# Patient Record
Sex: MALE | Race: WHITE | Employment: FULL TIME | URBAN - METROPOLITAN AREA
[De-identification: names, ages, dates, MRNs, and addresses within clinical notes are randomized per-mention and may not be internally consistent; named-entity substitution may affect disease eponyms.]

---

## 2017-07-20 ENCOUNTER — HOSPITAL ENCOUNTER (EMERGENCY)
Age: 27
Discharge: HOME | End: 2017-07-20
Attending: EMERGENCY MEDICINE
Payer: COMMERCIAL

## 2017-07-20 VITALS
WEIGHT: 190 LBS | BODY MASS INDEX: 28.79 KG/M2 | RESPIRATION RATE: 16 BRPM | SYSTOLIC BLOOD PRESSURE: 117 MMHG | HEIGHT: 68 IN | HEART RATE: 77 BPM | OXYGEN SATURATION: 100 % | DIASTOLIC BLOOD PRESSURE: 69 MMHG | TEMPERATURE: 97.5 F

## 2017-07-20 DIAGNOSIS — S61.209A FINGERTIP AVULSION, INITIAL ENCOUNTER: Primary | ICD-10-CM

## 2017-07-20 PROCEDURE — 99282 EMERGENCY DEPT VISIT SF MDM: CPT

## 2017-07-20 PROCEDURE — 2500000003 HC RX 250 WO HCPCS

## 2017-07-20 PROCEDURE — 90715 TDAP VACCINE 7 YRS/> IM: CPT | Performed by: PHYSICIAN ASSISTANT

## 2017-07-20 PROCEDURE — 2500000003 HC RX 250 WO HCPCS: Performed by: PHYSICIAN ASSISTANT

## 2017-07-20 PROCEDURE — 6360000002 HC RX W HCPCS: Performed by: PHYSICIAN ASSISTANT

## 2017-07-20 PROCEDURE — 6370000000 HC RX 637 (ALT 250 FOR IP): Performed by: EMERGENCY MEDICINE

## 2017-07-20 PROCEDURE — 12001 RPR S/N/AX/GEN/TRNK 2.5CM/<: CPT

## 2017-07-20 PROCEDURE — 90471 IMMUNIZATION ADMIN: CPT | Performed by: PHYSICIAN ASSISTANT

## 2017-07-20 RX ORDER — LIDOCAINE HYDROCHLORIDE AND EPINEPHRINE BITARTRATE 20; .01 MG/ML; MG/ML
20 INJECTION, SOLUTION SUBCUTANEOUS ONCE
Status: COMPLETED | OUTPATIENT
Start: 2017-07-20 | End: 2017-07-20

## 2017-07-20 RX ORDER — OXYCODONE HYDROCHLORIDE AND ACETAMINOPHEN 5; 325 MG/1; MG/1
2 TABLET ORAL ONCE
Status: COMPLETED | OUTPATIENT
Start: 2017-07-20 | End: 2017-07-20

## 2017-07-20 RX ORDER — LIDOCAINE HYDROCHLORIDE AND EPINEPHRINE 10; 10 MG/ML; UG/ML
20 INJECTION, SOLUTION INFILTRATION; PERINEURAL ONCE
Status: DISCONTINUED | OUTPATIENT
Start: 2017-07-20 | End: 2017-07-20

## 2017-07-20 RX ORDER — LIDOCAINE HYDROCHLORIDE 10 MG/ML
INJECTION, SOLUTION INFILTRATION; PERINEURAL
Status: COMPLETED
Start: 2017-07-20 | End: 2017-07-20

## 2017-07-20 RX ADMIN — OXYCODONE HYDROCHLORIDE AND ACETAMINOPHEN 2 TABLET: 5; 325 TABLET ORAL at 21:21

## 2017-07-20 RX ADMIN — LIDOCAINE HYDROCHLORIDE,EPINEPHRINE BITARTRATE 20 ML: 20; .01 INJECTION, SOLUTION INFILTRATION; PERINEURAL at 20:59

## 2017-07-20 RX ADMIN — LIDOCAINE HYDROCHLORIDE: 10 INJECTION, SOLUTION INFILTRATION; PERINEURAL at 20:15

## 2017-07-20 RX ADMIN — TETANUS TOXOID, REDUCED DIPHTHERIA TOXOID AND ACELLULAR PERTUSSIS VACCINE, ADSORBED 0.5 ML: 5; 2.5; 8; 8; 2.5 SUSPENSION INTRAMUSCULAR at 21:12

## 2017-07-20 ASSESSMENT — PAIN DESCRIPTION - LOCATION: LOCATION: FINGER (COMMENT WHICH ONE)

## 2017-07-20 ASSESSMENT — ENCOUNTER SYMPTOMS
STRIDOR: 0
COUGH: 0
WHEEZING: 0

## 2017-07-20 ASSESSMENT — PAIN SCALES - GENERAL
PAINLEVEL_OUTOF10: 10
PAINLEVEL_OUTOF10: 8
PAINLEVEL_OUTOF10: 10
PAINLEVEL_OUTOF10: 10

## 2018-09-13 ENCOUNTER — HOSPITAL ENCOUNTER (OUTPATIENT)
Dept: GENERAL RADIOLOGY | Age: 28
Discharge: HOME | End: 2018-09-15
Payer: COMMERCIAL

## 2018-09-13 ENCOUNTER — HOSPITAL ENCOUNTER (OUTPATIENT)
Age: 28
Discharge: HOME | End: 2018-09-13
Payer: COMMERCIAL

## 2018-09-13 DIAGNOSIS — T14.90XA INJURY: ICD-10-CM

## 2018-09-13 PROCEDURE — 73610 X-RAY EXAM OF ANKLE: CPT

## 2019-06-06 ENCOUNTER — HOSPITAL ENCOUNTER (INPATIENT)
Age: 29
LOS: 1 days | Discharge: HOME | DRG: 379 | End: 2019-06-07
Attending: EMERGENCY MEDICINE | Admitting: INTERNAL MEDICINE
Payer: COMMERCIAL

## 2019-06-06 ENCOUNTER — APPOINTMENT (OUTPATIENT)
Dept: GENERAL RADIOLOGY | Age: 29
DRG: 379 | End: 2019-06-06
Payer: COMMERCIAL

## 2019-06-06 ENCOUNTER — APPOINTMENT (OUTPATIENT)
Dept: CT IMAGING | Age: 29
DRG: 379 | End: 2019-06-06
Payer: COMMERCIAL

## 2019-06-06 DIAGNOSIS — K62.5 RECTAL BLEED: Primary | ICD-10-CM

## 2019-06-06 DIAGNOSIS — R59.0 ABDOMINAL LYMPHADENOPATHY: ICD-10-CM

## 2019-06-06 PROBLEM — K92.2 GI BLEED: Status: ACTIVE | Noted: 2019-06-06

## 2019-06-06 LAB
ABO/RH: NORMAL
ABSOLUTE EOS #: 0.1 K/UL (ref 0–0.4)
ABSOLUTE IMMATURE GRANULOCYTE: NORMAL K/UL (ref 0–0.3)
ABSOLUTE LYMPH #: 1.8 K/UL (ref 1–4.8)
ABSOLUTE MONO #: 0.5 K/UL (ref 0.1–1.3)
ALBUMIN SERPL-MCNC: 4.6 G/DL (ref 3.5–5.2)
ALBUMIN/GLOBULIN RATIO: NORMAL (ref 1–2.5)
ALP BLD-CCNC: 74 U/L (ref 40–129)
ALT SERPL-CCNC: 29 U/L (ref 5–41)
ANION GAP SERPL CALCULATED.3IONS-SCNC: 15 MMOL/L (ref 9–17)
ANTIBODY SCREEN: NEGATIVE
ARM BAND NUMBER: NORMAL
AST SERPL-CCNC: 28 U/L
BASOPHILS # BLD: 1 % (ref 0–2)
BASOPHILS ABSOLUTE: 0.1 K/UL (ref 0–0.2)
BILIRUB SERPL-MCNC: 0.53 MG/DL (ref 0.3–1.2)
BLOOD BANK COMMENT: NORMAL
BUN BLDV-MCNC: 15 MG/DL (ref 6–20)
BUN/CREAT BLD: NORMAL (ref 9–20)
CALCIUM SERPL-MCNC: 8.9 MG/DL (ref 8.6–10.4)
CHLORIDE BLD-SCNC: 104 MMOL/L (ref 98–107)
CO2: 21 MMOL/L (ref 20–31)
CREAT SERPL-MCNC: 1.04 MG/DL (ref 0.7–1.2)
DIFFERENTIAL TYPE: NORMAL
EOSINOPHILS RELATIVE PERCENT: 1 % (ref 0–4)
EXPIRATION DATE: NORMAL
GFR AFRICAN AMERICAN: >60 ML/MIN
GFR NON-AFRICAN AMERICAN: >60 ML/MIN
GFR SERPL CREATININE-BSD FRML MDRD: NORMAL ML/MIN/{1.73_M2}
GFR SERPL CREATININE-BSD FRML MDRD: NORMAL ML/MIN/{1.73_M2}
GLUCOSE BLD-MCNC: 88 MG/DL (ref 70–99)
HCT VFR BLD CALC: 42.4 % (ref 41–53)
HEMOGLOBIN: 14.5 G/DL (ref 13.5–17.5)
IMMATURE GRANULOCYTES: NORMAL %
INR BLD: 1
LIPASE: 34 U/L (ref 13–60)
LYMPHOCYTES # BLD: 27 % (ref 24–44)
MAGNESIUM: 1.9 MG/DL (ref 1.6–2.6)
MCH RBC QN AUTO: 28.9 PG (ref 26–34)
MCHC RBC AUTO-ENTMCNC: 34.2 G/DL (ref 31–37)
MCV RBC AUTO: 84.7 FL (ref 80–100)
MONOCYTES # BLD: 7 % (ref 1–7)
NRBC AUTOMATED: NORMAL PER 100 WBC
PARTIAL THROMBOPLASTIN TIME: 27.2 SEC (ref 24–36)
PDW BLD-RTO: 13.3 % (ref 11.5–14.9)
PLATELET # BLD: 211 K/UL (ref 150–450)
PLATELET ESTIMATE: NORMAL
PMV BLD AUTO: 6.9 FL (ref 6–12)
POTASSIUM SERPL-SCNC: 3.8 MMOL/L (ref 3.7–5.3)
PROTHROMBIN TIME: 12.8 SEC (ref 11.8–14.6)
RBC # BLD: 5.01 M/UL (ref 4.5–5.9)
RBC # BLD: NORMAL 10*6/UL
SEG NEUTROPHILS: 64 % (ref 36–66)
SEGMENTED NEUTROPHILS ABSOLUTE COUNT: 4.3 K/UL (ref 1.3–9.1)
SODIUM BLD-SCNC: 140 MMOL/L (ref 135–144)
TOTAL PROTEIN: 7.6 G/DL (ref 6.4–8.3)
WBC # BLD: 6.8 K/UL (ref 3.5–11)
WBC # BLD: NORMAL 10*3/UL

## 2019-06-06 PROCEDURE — 96374 THER/PROPH/DIAG INJ IV PUSH: CPT

## 2019-06-06 PROCEDURE — G0378 HOSPITAL OBSERVATION PER HR: HCPCS

## 2019-06-06 PROCEDURE — 85610 PROTHROMBIN TIME: CPT

## 2019-06-06 PROCEDURE — 85025 COMPLETE CBC W/AUTO DIFF WBC: CPT

## 2019-06-06 PROCEDURE — 80053 COMPREHEN METABOLIC PANEL: CPT

## 2019-06-06 PROCEDURE — 2500000003 HC RX 250 WO HCPCS: Performed by: NURSE PRACTITIONER

## 2019-06-06 PROCEDURE — 71046 X-RAY EXAM CHEST 2 VIEWS: CPT

## 2019-06-06 PROCEDURE — 99999 PR OFFICE/OUTPT VISIT,PROCEDURE ONLY: CPT | Performed by: INTERNAL MEDICINE

## 2019-06-06 PROCEDURE — 86900 BLOOD TYPING SEROLOGIC ABO: CPT

## 2019-06-06 PROCEDURE — 83690 ASSAY OF LIPASE: CPT

## 2019-06-06 PROCEDURE — 6360000002 HC RX W HCPCS: Performed by: NURSE PRACTITIONER

## 2019-06-06 PROCEDURE — 99285 EMERGENCY DEPT VISIT HI MDM: CPT

## 2019-06-06 PROCEDURE — 86901 BLOOD TYPING SEROLOGIC RH(D): CPT

## 2019-06-06 PROCEDURE — 86850 RBC ANTIBODY SCREEN: CPT

## 2019-06-06 PROCEDURE — 74177 CT ABD & PELVIS W/CONTRAST: CPT

## 2019-06-06 PROCEDURE — 83735 ASSAY OF MAGNESIUM: CPT

## 2019-06-06 PROCEDURE — 2580000003 HC RX 258: Performed by: EMERGENCY MEDICINE

## 2019-06-06 PROCEDURE — 96375 TX/PRO/DX INJ NEW DRUG ADDON: CPT

## 2019-06-06 PROCEDURE — 6360000002 HC RX W HCPCS: Performed by: EMERGENCY MEDICINE

## 2019-06-06 PROCEDURE — 2580000003 HC RX 258: Performed by: INTERNAL MEDICINE

## 2019-06-06 PROCEDURE — 96376 TX/PRO/DX INJ SAME DRUG ADON: CPT

## 2019-06-06 PROCEDURE — 6360000004 HC RX CONTRAST MEDICATION: Performed by: EMERGENCY MEDICINE

## 2019-06-06 PROCEDURE — 85730 THROMBOPLASTIN TIME PARTIAL: CPT

## 2019-06-06 PROCEDURE — 36415 COLL VENOUS BLD VENIPUNCTURE: CPT

## 2019-06-06 RX ORDER — MAGNESIUM SULFATE 1 G/100ML
1 INJECTION INTRAVENOUS PRN
Status: DISCONTINUED | OUTPATIENT
Start: 2019-06-06 | End: 2019-06-07 | Stop reason: HOSPADM

## 2019-06-06 RX ORDER — 0.9 % SODIUM CHLORIDE 0.9 %
80 INTRAVENOUS SOLUTION INTRAVENOUS ONCE
Status: COMPLETED | OUTPATIENT
Start: 2019-06-06 | End: 2019-06-06

## 2019-06-06 RX ORDER — SODIUM CHLORIDE 0.9 % (FLUSH) 0.9 %
10 SYRINGE (ML) INJECTION PRN
Status: DISCONTINUED | OUTPATIENT
Start: 2019-06-06 | End: 2019-06-07 | Stop reason: HOSPADM

## 2019-06-06 RX ORDER — FENTANYL CITRATE 50 UG/ML
100 INJECTION, SOLUTION INTRAMUSCULAR; INTRAVENOUS ONCE
Status: COMPLETED | OUTPATIENT
Start: 2019-06-06 | End: 2019-06-06

## 2019-06-06 RX ORDER — ONDANSETRON 2 MG/ML
4 INJECTION INTRAMUSCULAR; INTRAVENOUS EVERY 6 HOURS PRN
Status: DISCONTINUED | OUTPATIENT
Start: 2019-06-06 | End: 2019-06-07 | Stop reason: HOSPADM

## 2019-06-06 RX ORDER — ACETAMINOPHEN 325 MG/1
650 TABLET ORAL EVERY 4 HOURS PRN
Status: DISCONTINUED | OUTPATIENT
Start: 2019-06-06 | End: 2019-06-07 | Stop reason: HOSPADM

## 2019-06-06 RX ORDER — NICOTINE 21 MG/24HR
1 PATCH, TRANSDERMAL 24 HOURS TRANSDERMAL DAILY PRN
Status: DISCONTINUED | OUTPATIENT
Start: 2019-06-06 | End: 2019-06-07 | Stop reason: HOSPADM

## 2019-06-06 RX ORDER — MORPHINE SULFATE 4 MG/ML
4 INJECTION, SOLUTION INTRAMUSCULAR; INTRAVENOUS ONCE
Status: COMPLETED | OUTPATIENT
Start: 2019-06-06 | End: 2019-06-06

## 2019-06-06 RX ORDER — SODIUM CHLORIDE 0.9 % (FLUSH) 0.9 %
10 SYRINGE (ML) INJECTION EVERY 12 HOURS SCHEDULED
Status: DISCONTINUED | OUTPATIENT
Start: 2019-06-06 | End: 2019-06-07 | Stop reason: HOSPADM

## 2019-06-06 RX ORDER — POTASSIUM CHLORIDE 20 MEQ/1
40 TABLET, EXTENDED RELEASE ORAL PRN
Status: DISCONTINUED | OUTPATIENT
Start: 2019-06-06 | End: 2019-06-07 | Stop reason: HOSPADM

## 2019-06-06 RX ORDER — DEXTROSE, SODIUM CHLORIDE, AND POTASSIUM CHLORIDE 5; .45; .15 G/100ML; G/100ML; G/100ML
INJECTION INTRAVENOUS CONTINUOUS
Status: DISCONTINUED | OUTPATIENT
Start: 2019-06-06 | End: 2019-06-07 | Stop reason: HOSPADM

## 2019-06-06 RX ORDER — ONDANSETRON 2 MG/ML
4 INJECTION INTRAMUSCULAR; INTRAVENOUS ONCE
Status: COMPLETED | OUTPATIENT
Start: 2019-06-06 | End: 2019-06-06

## 2019-06-06 RX ORDER — POTASSIUM CHLORIDE 7.45 MG/ML
10 INJECTION INTRAVENOUS PRN
Status: DISCONTINUED | OUTPATIENT
Start: 2019-06-06 | End: 2019-06-07 | Stop reason: HOSPADM

## 2019-06-06 RX ADMIN — IOVERSOL 75 ML: 741 INJECTION INTRA-ARTERIAL; INTRAVENOUS at 19:55

## 2019-06-06 RX ADMIN — FENTANYL CITRATE 100 MCG: 50 INJECTION INTRAMUSCULAR; INTRAVENOUS at 19:29

## 2019-06-06 RX ADMIN — ONDANSETRON 4 MG: 2 INJECTION INTRAMUSCULAR; INTRAVENOUS at 18:21

## 2019-06-06 RX ADMIN — POTASSIUM CHLORIDE, DEXTROSE MONOHYDRATE AND SODIUM CHLORIDE: 150; 5; 450 INJECTION, SOLUTION INTRAVENOUS at 23:16

## 2019-06-06 RX ADMIN — MORPHINE SULFATE 4 MG: 4 INJECTION INTRAVENOUS at 18:23

## 2019-06-06 RX ADMIN — SODIUM CHLORIDE 80 ML: 9 INJECTION, SOLUTION INTRAVENOUS at 19:54

## 2019-06-06 RX ADMIN — FENTANYL CITRATE 100 MCG: 50 INJECTION, SOLUTION INTRAMUSCULAR; INTRAVENOUS at 21:26

## 2019-06-06 RX ADMIN — Medication 10 ML: at 22:13

## 2019-06-06 RX ADMIN — HYDROMORPHONE HYDROCHLORIDE 0.5 MG: 1 INJECTION, SOLUTION INTRAMUSCULAR; INTRAVENOUS; SUBCUTANEOUS at 22:58

## 2019-06-06 RX ADMIN — Medication 10 ML: at 19:55

## 2019-06-06 ASSESSMENT — PAIN SCALES - GENERAL
PAINLEVEL_OUTOF10: 9
PAINLEVEL_OUTOF10: 0
PAINLEVEL_OUTOF10: 5
PAINLEVEL_OUTOF10: 8
PAINLEVEL_OUTOF10: 7
PAINLEVEL_OUTOF10: 5
PAINLEVEL_OUTOF10: 8

## 2019-06-06 ASSESSMENT — PAIN DESCRIPTION - PAIN TYPE
TYPE: ACUTE PAIN
TYPE: ACUTE PAIN

## 2019-06-06 ASSESSMENT — ENCOUNTER SYMPTOMS
CONSTIPATION: 0
ABDOMINAL PAIN: 1
BACK PAIN: 0
TROUBLE SWALLOWING: 0
DIARRHEA: 0
SORE THROAT: 0
COLOR CHANGE: 0
BLOOD IN STOOL: 1
NAUSEA: 0
COUGH: 0
VOMITING: 0
SHORTNESS OF BREATH: 0

## 2019-06-06 ASSESSMENT — PAIN DESCRIPTION - LOCATION: LOCATION: ABDOMEN

## 2019-06-06 ASSESSMENT — PAIN DESCRIPTION - ORIENTATION: ORIENTATION: LEFT;MID;LOWER;UPPER

## 2019-06-06 ASSESSMENT — PAIN DESCRIPTION - DESCRIPTORS: DESCRIPTORS: ACHING

## 2019-06-06 NOTE — ED NOTES
Writer stood by for rectal exam performed by Dr. Abi Franklin. Pt tolerated the procedure fair.       Markie Alarcon RN  06/06/19 1921

## 2019-06-06 NOTE — ED NOTES
Report given to Arlester Schirmer ED RN, who is assuming care of the Pt at this time. Writer updated in regards to issues that brought the Pt into the ED, diagnosis, assessment, VS's, MEWs, Labs, C-Xray, CT Scan ordered, and meds given. All questions answered.         Annette Mccann RN  06/06/19 8435
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
## 2019-06-07 VITALS
SYSTOLIC BLOOD PRESSURE: 142 MMHG | RESPIRATION RATE: 16 BRPM | DIASTOLIC BLOOD PRESSURE: 64 MMHG | TEMPERATURE: 98.4 F | HEIGHT: 68 IN | WEIGHT: 201.72 LBS | OXYGEN SATURATION: 100 % | BODY MASS INDEX: 30.57 KG/M2 | HEART RATE: 68 BPM

## 2019-06-07 PROBLEM — I88.0 MESENTERIC ADENITIS: Status: ACTIVE | Noted: 2019-06-07

## 2019-06-07 PROBLEM — R59.0 MESENTERIC LYMPHADENOPATHY: Status: ACTIVE | Noted: 2019-06-07

## 2019-06-07 LAB
ANION GAP SERPL CALCULATED.3IONS-SCNC: 9 MMOL/L (ref 9–17)
BUN BLDV-MCNC: 15 MG/DL (ref 6–20)
BUN/CREAT BLD: ABNORMAL (ref 9–20)
CALCIUM SERPL-MCNC: 8.8 MG/DL (ref 8.6–10.4)
CHLORIDE BLD-SCNC: 108 MMOL/L (ref 98–107)
CO2: 26 MMOL/L (ref 20–31)
CREAT SERPL-MCNC: 1.16 MG/DL (ref 0.7–1.2)
GFR AFRICAN AMERICAN: >60 ML/MIN
GFR NON-AFRICAN AMERICAN: >60 ML/MIN
GFR SERPL CREATININE-BSD FRML MDRD: ABNORMAL ML/MIN/{1.73_M2}
GFR SERPL CREATININE-BSD FRML MDRD: ABNORMAL ML/MIN/{1.73_M2}
GLUCOSE BLD-MCNC: 99 MG/DL (ref 70–99)
HCT VFR BLD CALC: 38 % (ref 41–53)
HEMOGLOBIN: 13.4 G/DL (ref 13.5–17.5)
INR BLD: 1.1
MCH RBC QN AUTO: 29.8 PG (ref 26–34)
MCHC RBC AUTO-ENTMCNC: 35.3 G/DL (ref 31–37)
MCV RBC AUTO: 84.3 FL (ref 80–100)
NRBC AUTOMATED: ABNORMAL PER 100 WBC
PDW BLD-RTO: 13.3 % (ref 11.5–14.9)
PLATELET # BLD: 191 K/UL (ref 150–450)
PMV BLD AUTO: 6.9 FL (ref 6–12)
POTASSIUM SERPL-SCNC: 3.8 MMOL/L (ref 3.7–5.3)
PROTHROMBIN TIME: 13.8 SEC (ref 11.8–14.6)
RBC # BLD: 4.51 M/UL (ref 4.5–5.9)
SODIUM BLD-SCNC: 143 MMOL/L (ref 135–144)
WBC # BLD: 4.7 K/UL (ref 3.5–11)

## 2019-06-07 PROCEDURE — 2500000003 HC RX 250 WO HCPCS: Performed by: NURSE PRACTITIONER

## 2019-06-07 PROCEDURE — 99254 IP/OBS CNSLTJ NEW/EST MOD 60: CPT | Performed by: INTERNAL MEDICINE

## 2019-06-07 PROCEDURE — 80048 BASIC METABOLIC PNL TOTAL CA: CPT

## 2019-06-07 PROCEDURE — 96375 TX/PRO/DX INJ NEW DRUG ADDON: CPT

## 2019-06-07 PROCEDURE — 85610 PROTHROMBIN TIME: CPT

## 2019-06-07 PROCEDURE — C9113 INJ PANTOPRAZOLE SODIUM, VIA: HCPCS | Performed by: NURSE PRACTITIONER

## 2019-06-07 PROCEDURE — 2580000003 HC RX 258: Performed by: INTERNAL MEDICINE

## 2019-06-07 PROCEDURE — 99255 IP/OBS CONSLTJ NEW/EST HI 80: CPT | Performed by: INTERNAL MEDICINE

## 2019-06-07 PROCEDURE — APPNB30 APP NON BILLABLE TIME 0-30 MINS: Performed by: NURSE PRACTITIONER

## 2019-06-07 PROCEDURE — 96376 TX/PRO/DX INJ SAME DRUG ADON: CPT

## 2019-06-07 PROCEDURE — 1200000000 HC SEMI PRIVATE

## 2019-06-07 PROCEDURE — G0378 HOSPITAL OBSERVATION PER HR: HCPCS

## 2019-06-07 PROCEDURE — 6360000002 HC RX W HCPCS: Performed by: NURSE PRACTITIONER

## 2019-06-07 PROCEDURE — 36415 COLL VENOUS BLD VENIPUNCTURE: CPT

## 2019-06-07 PROCEDURE — 85027 COMPLETE CBC AUTOMATED: CPT

## 2019-06-07 PROCEDURE — 2580000003 HC RX 258: Performed by: NURSE PRACTITIONER

## 2019-06-07 RX ORDER — OMEPRAZOLE 20 MG/1
20 CAPSULE, DELAYED RELEASE ORAL DAILY
Qty: 15 CAPSULE | Refills: 0 | Status: SHIPPED | OUTPATIENT
Start: 2019-06-07 | End: 2020-11-19

## 2019-06-07 RX ORDER — PANTOPRAZOLE SODIUM 40 MG/10ML
40 INJECTION, POWDER, LYOPHILIZED, FOR SOLUTION INTRAVENOUS DAILY
Status: DISCONTINUED | OUTPATIENT
Start: 2019-06-07 | End: 2019-06-07 | Stop reason: HOSPADM

## 2019-06-07 RX ORDER — 0.9 % SODIUM CHLORIDE 0.9 %
10 VIAL (ML) INJECTION DAILY
Status: DISCONTINUED | OUTPATIENT
Start: 2019-06-07 | End: 2019-06-07 | Stop reason: HOSPADM

## 2019-06-07 RX ADMIN — SODIUM CHLORIDE 10 ML: 9 INJECTION INTRAMUSCULAR; INTRAVENOUS; SUBCUTANEOUS at 10:21

## 2019-06-07 RX ADMIN — HYDROMORPHONE HYDROCHLORIDE 0.5 MG: 1 INJECTION, SOLUTION INTRAMUSCULAR; INTRAVENOUS; SUBCUTANEOUS at 12:20

## 2019-06-07 RX ADMIN — HYDROMORPHONE HYDROCHLORIDE 0.5 MG: 1 INJECTION, SOLUTION INTRAMUSCULAR; INTRAVENOUS; SUBCUTANEOUS at 03:20

## 2019-06-07 RX ADMIN — POTASSIUM CHLORIDE, DEXTROSE MONOHYDRATE AND SODIUM CHLORIDE: 150; 5; 450 INJECTION, SOLUTION INTRAVENOUS at 12:20

## 2019-06-07 RX ADMIN — HYDROMORPHONE HYDROCHLORIDE 0.5 MG: 1 INJECTION, SOLUTION INTRAMUSCULAR; INTRAVENOUS; SUBCUTANEOUS at 16:28

## 2019-06-07 RX ADMIN — Medication 10 ML: at 10:22

## 2019-06-07 RX ADMIN — PANTOPRAZOLE SODIUM 40 MG: 40 INJECTION, POWDER, FOR SOLUTION INTRAVENOUS at 10:21

## 2019-06-07 RX ADMIN — HYDROMORPHONE HYDROCHLORIDE 0.5 MG: 1 INJECTION, SOLUTION INTRAMUSCULAR; INTRAVENOUS; SUBCUTANEOUS at 08:24

## 2019-06-07 ASSESSMENT — PAIN SCALES - GENERAL
PAINLEVEL_OUTOF10: 8
PAINLEVEL_OUTOF10: 8
PAINLEVEL_OUTOF10: 0
PAINLEVEL_OUTOF10: 7
PAINLEVEL_OUTOF10: 6
PAINLEVEL_OUTOF10: 9
PAINLEVEL_OUTOF10: 6

## 2019-06-07 ASSESSMENT — ENCOUNTER SYMPTOMS
CONSTIPATION: 0
COUGH: 0
DIARRHEA: 0
BACK PAIN: 0
WHEEZING: 0
SORE THROAT: 0
ABDOMINAL PAIN: 1
SHORTNESS OF BREATH: 0
RECTAL PAIN: 1
VOMITING: 0
BLOOD IN STOOL: 1
NAUSEA: 1

## 2019-06-07 NOTE — ED NOTES
Pt ambulated to the bathroom with a steady gait with this RN. Pt appears to be in no distress at this time.       Caron Stevens RN  06/06/19 2012

## 2019-06-07 NOTE — PROGRESS NOTES
Dr. Sebastián Juarez in to examine patient. Patient anxious to discharge and follow-up outpatient. Dr. Sebastián Juarez wants patient to call his office Monday morning to schedule a colonoscopy within the next 2 weeks.
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
## 2019-06-07 NOTE — DISCHARGE SUMMARY
Internal Medicine   Discharge Summary         Patient Identification:  Serafin Younger is a 25 y.o. male. :  1990  MRN: [id number]     Acct: [de-identified]   Admit Date:  2019  Discharge date and time: No discharge date for patient encounter. Attending Provider: Antonio Treadwell MD                                       Reason for Admission: rectal bleed     Discharge Diagnoses:   Patient Active Problem List   Diagnosis    GI bleed    Abdominal lymphadenopathy    Rectal bleed          Consults: Oncology GI   Procedures:    Hospital Course:   Pt with abdominal pain has been using NSAID for pain   Noted ot have blood in stools   Hb is stable more than 13   mesentric Lymphadenopathy recheck Ct in 4 weeks asuggested   Stable for dc         Disposition:   Home  Discharged Condition:  Stable     Discharge Medications:       Caro Dozier   Home Medication Instructions [facility]:[id number]    Printed on:19 3586   Medication Information                      omeprazole (PRILOSEC) 20 MG delayed release capsule  Take 1 capsule by mouth Daily                 Discharge Instructions: Follow up with Mesha Bonilla MD in 2 weeks.   FU -> ct abdomen for f/u lymphadenoathy       Hospital acquired Infections: None      Ge AYALA attending

## 2019-06-07 NOTE — PLAN OF CARE
Problem: Pain:  Goal: Control of acute pain  Description  Control of acute pain  Outcome: Ongoing  Note:   Patient expresses relief following administration of prn pain medication.
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
## 2019-06-07 NOTE — CARE COORDINATION
CASE MANAGEMENT NOTE:    Admission Date:  6/6/2019 Betty Patel is a 25 y.o.  male    Admitted for : GI bleed [K92.2]    Met with:  Patient    PCP:  Dr. Armand Isaac:  Brynn Griggs      Current Residence/ Living Arrangements:  independently at home with spouse. Current Services PTA:  No    Is patient agreeable to VNS: No    Freedom of choice provided: Yes    List of [address] provided: No    VNS chosen:  NA    DME:  none    Home Oxygen: No    Nebulizer: No    CPAP/BIPAP: No    Supplier: N/A    Potential Assistance Needed: No    SNF needed: No    Pharmacy:  [facility]       Is the Patient an [employer] with Readmission Risk Score greater than 14%? No  If yes, pt needs a follow up appointment made within 7 days. Does Patient want to use MEDS to BEDS? No    Family Members/Caregivers that pt would like involved in their care:    Yes    If yes, list name here:  Crissy Farias    Transportation Provider:  Patient and Family             Is patient in Isolation/One on One/Altered Mental Status? No  If yes, skip next question. If no, would they like an I-Pad to  use? No  If yes, call [phone]. Discharge Plan:  Home without needs.                  Electronically signed by: Pedro Bella RN on 6/7/2019 at 11:00 AM
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
## 2019-06-07 NOTE — ED NOTES
Report given to Srikanth Pineda from Rickey Ferraro, [facility]. Report method by phone   The following was reviewed with receiving RN:   Current vital signs:  /78   Pulse 79   Temp 98 °F (36.7 °C) (Oral)   Resp 15   Ht 5' 8\" (1.727 m)   Wt 200 lb (90.7 kg)   SpO2 100%   BMI 30.41 kg/m²                MEWS Score: 1     Any medication or safety alerts were reviewed. Any pending diagnostics and notifications were also reviewed, as well as any safety concerns or issues, abnormal labs, abnormal imaging, and abnormal assessment findings. Questions were answered.             Jody Lund RN  06/06/19 1017

## 2019-06-07 NOTE — FLOWSHEET NOTE
06/07/19 0948   Encounter Summary   Services provided to: Patient   Referral/Consult From: [place]   Continue Visiting   (6/7/19)   Complexity of Encounter Low   Length of Encounter 15 minutes   Spiritual/Jewish   Type Ritual   Intervention Anointing   Sacraments   Sacrament of Sick-Anointing Anointed  (Fr Britton 6/7/19)
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
## 2019-06-10 ENCOUNTER — TELEPHONE (OUTPATIENT)
Dept: GASTROENTEROLOGY | Age: 29
End: 2019-06-10

## 2019-06-10 ENCOUNTER — TELEPHONE (OUTPATIENT)
Dept: FAMILY MEDICINE CLINIC | Age: 29
End: 2019-06-10

## 2019-06-10 DIAGNOSIS — K62.5 RECTAL BLEED: Primary | ICD-10-CM

## 2019-06-10 DIAGNOSIS — R59.0 ABDOMINAL LYMPHADENOPATHY: ICD-10-CM

## 2019-06-10 DIAGNOSIS — K62.5 GASTROINTESTINAL HEMORRHAGE ASSOCIATED WITH ANORECTAL SOURCE: Primary | ICD-10-CM

## 2019-06-10 DIAGNOSIS — R10.9 ABDOMINAL PAIN, UNSPECIFIED ABDOMINAL LOCATION: ICD-10-CM

## 2019-06-10 RX ORDER — SODIUM, POTASSIUM,MAG SULFATES 17.5-3.13G
SOLUTION, RECONSTITUTED, ORAL ORAL
Qty: 1 BOTTLE | Refills: 0 | Status: SHIPPED | OUTPATIENT
Start: 2019-06-10 | End: 2020-11-19

## 2019-06-10 NOTE — TELEPHONE ENCOUNTER
[facility] Trans[facility] Initial Follow Up Call    Outreach made within 2 business days of discharge: Yes    Patient: Shannan Roche Patient : 1990   MRN: [id number]  Reason for Admission: There are no discharge diagnoses documented for the most recent discharge. Discharge Date: 19       Spoke with: Self    Discharge department/facility:     TCM Interactive Patient Contact:  Was patient able to fill all prescriptions: Yes  Was patient instructed to bring all medications to the follow-up visit: Yes  Is patient taking all medications as directed in the discharge summary? yes  Does patient understand their discharge instructions: Yes  Does patient have questions or concerns that need addressed prior to 7-14 day follow up office visit: no    Scheduled appointment with PCP within 7-14 days    Follow Up  No future appointments.     Myron Alonzo MA

## 2019-06-10 NOTE — TELEPHONE ENCOUNTER
patient called b/c they were in the hospital GI Issues and discharged on 06/07/19 . Patient calling to get a follow up appt and needs a referral to a GI B/C hospital referred to Dr Powell Knife not able to get in until the end of June.

## 2019-06-10 NOTE — TELEPHONE ENCOUNTER
Patient called into schedule colonoscopy from hospital consult while an inpt at [facility]. Writer spoke w/ Mandi about scheduling colon proc first or office visit f/u. Dr Sammie Bryant wanted colonoscopy to be scheduled. Pt is scheduled w/ Mandi at [facility] Tuesday 6/25/19 @ 8:45am proc time, 6:45am arrival time. Pt's Suprep prep instructions will be emailed to wife's email of [email or web address] and instructions were given over phone to pt. Pt's Suprep prep order will be sent to [place] on [address]. Pt was instructed he would need a . Pt verbalizes his understanding.

## 2019-06-11 ENCOUNTER — TELEPHONE (OUTPATIENT)
Dept: FAMILY MEDICINE CLINIC | Age: 29
End: 2019-06-11

## 2019-06-11 NOTE — TELEPHONE ENCOUNTER
(Pt being seen 6/12/19 with Bharathi Son for transitional. Was very picky about days/times he could come in)    Call from Ignacio Haney at [facility]. Pt was referred to Dr. Michelle Meneses at [facility]. Ignacio Haney called patient to schedule consult with their office, and patient told her he needed a procedure done asap. Patient is set up with Dr. Bobby Galicia for a procedure on 6/25/19, but does not want to see Dr Bobby Galicia. After pt's appt tomorrow, Ignacio Haney would just like a clarification on what exactly the patient was being referred for.      Ignacio Haney- [phone] ext [phone]

## 2019-06-12 ENCOUNTER — OFFICE VISIT (OUTPATIENT)
Dept: FAMILY MEDICINE CLINIC | Age: 29
End: 2019-06-12
Payer: COMMERCIAL

## 2019-06-12 VITALS
HEART RATE: 64 BPM | SYSTOLIC BLOOD PRESSURE: 106 MMHG | OXYGEN SATURATION: 98 % | BODY MASS INDEX: 30.01 KG/M2 | TEMPERATURE: 98.4 F | RESPIRATION RATE: 16 BRPM | WEIGHT: 198 LBS | DIASTOLIC BLOOD PRESSURE: 76 MMHG | HEIGHT: 68 IN

## 2019-06-12 DIAGNOSIS — K62.5 GASTROINTESTINAL HEMORRHAGE ASSOCIATED WITH ANORECTAL SOURCE: Primary | ICD-10-CM

## 2019-06-12 DIAGNOSIS — K62.5 RECTAL BLEED: ICD-10-CM

## 2019-06-12 DIAGNOSIS — R10.9 ABDOMINAL PAIN, UNSPECIFIED ABDOMINAL LOCATION: ICD-10-CM

## 2019-06-12 DIAGNOSIS — R14.0 ABDOMINAL BLOATING: ICD-10-CM

## 2019-06-12 DIAGNOSIS — R59.0 ABDOMINAL LYMPHADENOPATHY: ICD-10-CM

## 2019-06-12 PROCEDURE — 99496 TRANSJ CARE MGMT HIGH F2F 7D: CPT | Performed by: FAMILY MEDICINE

## 2019-06-12 RX ORDER — DICYCLOMINE HCL 20 MG
20 TABLET ORAL 3 TIMES DAILY PRN
Qty: 60 TABLET | Refills: 1 | Status: SHIPPED | OUTPATIENT
Start: 2019-06-12 | End: 2020-11-19

## 2019-06-12 ASSESSMENT — ENCOUNTER SYMPTOMS
ANAL BLEEDING: 1
SHORTNESS OF BREATH: 0
COUGH: 0
CONSTIPATION: 0
NAUSEA: 0
RECTAL PAIN: 1
ABDOMINAL PAIN: 1
SORE THROAT: 0
VOMITING: 0
ABDOMINAL DISTENTION: 0
CHEST TIGHTNESS: 0
DIARRHEA: 0
RHINORRHEA: 0
BACK PAIN: 0

## 2019-06-12 ASSESSMENT — PATIENT HEALTH QUESTIONNAIRE - PHQ9
2. FEELING DOWN, DEPRESSED OR HOPELESS: 0
SUM OF ALL RESPONSES TO PHQ QUESTIONS 1-9: 0
1. LITTLE INTEREST OR PLEASURE IN DOING THINGS: 0
SUM OF ALL RESPONSES TO PHQ QUESTIONS 1-9: 0
SUM OF ALL RESPONSES TO PHQ9 QUESTIONS 1 & 2: 0

## 2019-06-12 NOTE — PROGRESS NOTES
Subjective:      Patient ID: Prasanna Lu is a 25 y.o. male. HPI  Pt here for a hospital follow up secondary to abdominal pain, cramping, and bloating and rectal bleeding. He did have a CT scan and showed mesenteric lymphadenopathy. His labs were all normal. He did see GI and is scheduled for a colonoscopy in 2 weeks. He states his symptoms have been going on for about 3 weeks. No fever or chills. No N/V. No change in his diet. No recent travel no one else at home sick. He states the rectal bleeding has stopped, but still c/o abdominal cramping and bloating and feels like he is getting punched in the stomach. He denies any weight loss. He does c/o night sweats. Otherwise pt doing well on current tx and no other concerns today. The patient's past medical, surgical, social, and family history as well as his current medications and allergies were reviewed as documented in today's encounter. Current Outpatient Medications   Medication Sig Dispense Refill    dicyclomine (BENTYL) 20 MG tablet Take 1 tablet by mouth 3 times daily as needed (abdominal cramping) 60 tablet 1    Na Sulfate-K Sulfate-Mg Sulf 17.5-3.13-1.6 GM/177ML SOLN Use as directed in your patient instructions. 1 Bottle 0    omeprazole (PRILOSEC) 20 MG delayed release capsule Take 1 capsule by mouth Daily 15 capsule 0     No current facility-administered medications for this visit. Review of Systems   Constitutional: Negative for appetite change, chills, fatigue, fever and unexpected weight change. HENT: Negative for congestion, ear pain, rhinorrhea and sore throat. Respiratory: Negative for cough, chest tightness and shortness of breath. Cardiovascular: Negative for chest pain and palpitations. Gastrointestinal: Positive for abdominal pain (and cramping and bloating), anal bleeding (resolved) and rectal pain. Negative for abdominal distention, constipation, diarrhea, nausea and vomiting.    Genitourinary: Negative for difficulty urinating and dysuria. Musculoskeletal: Negative for arthralgias, back pain and myalgias. Skin: Negative for rash. Neurological: Negative for dizziness, weakness, light-headedness and headaches. Hematological: Negative for adenopathy. Psychiatric/Behavioral: Negative for behavioral problems and sleep disturbance. The patient is not nervous/anxious. Objective:   Physical Exam   Constitutional: He is oriented to person, place, and time. He appears well-developed. No distress. HENT:   Head: Normocephalic and atraumatic. Right Ear: External ear normal.   Left Ear: External ear normal.   Nose: Nose normal.   Mouth/Throat: Oropharynx is clear and moist. No oropharyngeal exudate. Eyes: Pupils are equal, round, and reactive to light. Conjunctivae and EOM are normal.   Neck: Normal range of motion. Cardiovascular: Normal rate, regular rhythm, normal heart sounds and intact distal pulses. No murmur heard. Pulmonary/Chest: Effort normal and breath sounds normal. No respiratory distress. He has no wheezes. He exhibits no tenderness. Abdominal: Soft. Bowel sounds are normal. He exhibits no distension and no mass. There is tenderness (mild left side). There is no rebound and no guarding. No hernia. Musculoskeletal: Normal range of motion. He exhibits no edema or tenderness. Lymphadenopathy:     He has no cervical adenopathy. Neurological: He is alert and oriented to person, place, and time. He has normal reflexes. Skin: No rash noted. Psychiatric: He has a normal mood and affect. His behavior is normal.   Vitals reviewed. Labs and CT Abdomen/pelvis reviewed with pt today. Assessment:       Diagnosis Orders   1. Gastrointestinal hemorrhage associated with anorectal source     2. Rectal bleed     3. Abdominal pain, unspecified abdominal location  dicyclomine (BENTYL) 20 MG tablet   4. Abdominal lymphadenopathy     5.  Abdominal bloating  dicyclomine (BENTYL) 20 MG tablet Plan:         Orders Placed This Encounter   Medications    dicyclomine (BENTYL) 20 MG tablet     Sig: Take 1 tablet by mouth 3 times daily as needed (abdominal cramping)     Dispense:  60 tablet     Refill:  1     Will have him get on the Omeprazole that was prescribed in the hospital    Will have him start an OTC Probiotic an yogurt dialy    Will try the Bentyl to take prn    Will keep the appt with Dr. Fred Mclean on 6/28 for the colonoscopy    Will follow up with Dr. Jannette Cormier after the colonoscopy secondary to the mesenteric lymphadenopathy     Will have him back here in a month to see how he is doing    Rest of systems unchanged, continue current treatments. Medications, labs, diagnostic studies, consultations and follow-up as documented in this encounter.  Rest of systems unchanged, continue current treatments        Katelynn Mann MD

## 2019-06-12 NOTE — PROGRESS NOTES
Visit Information    Have you changed or started any medications since your last visit including any over-the-counter medicines, vitamins, or herbal medicines? no   Have you stopped taking any of your medications? Is so, why? -  no  Are you having any side effects from any of your medications? - no    Have you seen any other physician or provider since your last visit?  no   Have you had any other diagnostic tests since your last visit? yes - while hospitalized   Have you been seen in the emergency room and/or had an admission in a hospital since we last saw you?  yes - GI bleed   Have you had your routine dental cleaning in the past 6 months?  yes - 06/2019     Do you have an active MyChart account? If no, what is the barrier? Yes    Patient Care Team:  Trever Pedraza MD as PCP - General (Family Medicine)  Trever Pedraza MD as PCP - [facility]  Nelly Santana MD as Surgeon (Otolaryngology)    Medical History Review  Past Medical, Family, and Social History reviewed and does not contribute to the patient presenting condition    Health Maintenance   Topic Date Due    HIV screen  08/02/2022 (Originally 8/30/2003)    Varicella Vaccine (1 of 2 - 13+ 2-dose series) 06/12/2024 (Originally 8/30/2001)    Flu vaccine (Season Ended) 09/01/2019    DTaP/Tdap/Td vaccine (3 - Td) 07/20/2027    Pneumococcal 0-64 years Vaccine  Aged Out     Patient/Caregiver verbalize understanding of medications.   Yes

## 2019-06-14 ENCOUNTER — TELEPHONE (OUTPATIENT)
Dept: FAMILY MEDICINE CLINIC | Age: 29
End: 2019-06-14

## 2019-06-14 NOTE — TELEPHONE ENCOUNTER
Patient c/ o  stomach pain they were in the office this week was  given medciation and  is still having pain. Patient states it feels like there being pucnched in stomach. Please advise.

## 2019-06-21 ENCOUNTER — TELEPHONE (OUTPATIENT)
Dept: GASTROENTEROLOGY | Age: 29
End: 2019-06-21

## 2019-06-21 NOTE — TELEPHONE ENCOUNTER
Patient confirmed his 06/25/19 colon at [facility]. He does have a  and briefly went over prep instructions. Offered to emailed patient new instructions and go over the rest of the prep. Patient declined.

## 2019-06-21 NOTE — TELEPHONE ENCOUNTER
Writer left msg on pt's vm to call back confirm he will be here for colon proc sched yani/ Mandi at [id number] 6/25/19 @ 8:45am proc time, 6:45am arrival time.  Also confirm pt will have a  and ask if he has any questions regarding his bowel prep ins

## 2019-06-24 ENCOUNTER — TELEPHONE (OUTPATIENT)
Dept: FAMILY MEDICINE CLINIC | Age: 29
End: 2019-06-24

## 2019-06-24 ENCOUNTER — ANESTHESIA EVENT (OUTPATIENT)
Dept: ENDOSCOPY | Age: 29
End: 2019-06-24
Payer: COMMERCIAL

## 2019-06-24 NOTE — TELEPHONE ENCOUNTER
Patient dropped off [facility] paperwork to be filled out. Form was filled out and faxed back to employer.

## 2019-06-25 ENCOUNTER — HOSPITAL ENCOUNTER (OUTPATIENT)
Age: 29
Setting detail: OUTPATIENT SURGERY
Discharge: HOME | End: 2019-06-25
Attending: INTERNAL MEDICINE | Admitting: INTERNAL MEDICINE
Payer: COMMERCIAL

## 2019-06-25 ENCOUNTER — ANESTHESIA (OUTPATIENT)
Dept: ENDOSCOPY | Age: 29
End: 2019-06-25
Payer: COMMERCIAL

## 2019-06-25 ENCOUNTER — APPOINTMENT (OUTPATIENT)
Dept: CT IMAGING | Age: 29
End: 2019-06-25
Attending: INTERNAL MEDICINE
Payer: COMMERCIAL

## 2019-06-25 VITALS — OXYGEN SATURATION: 98 % | DIASTOLIC BLOOD PRESSURE: 65 MMHG | SYSTOLIC BLOOD PRESSURE: 125 MMHG

## 2019-06-25 VITALS
HEART RATE: 72 BPM | DIASTOLIC BLOOD PRESSURE: 64 MMHG | BODY MASS INDEX: 30.31 KG/M2 | WEIGHT: 200 LBS | HEIGHT: 68 IN | TEMPERATURE: 97.9 F | RESPIRATION RATE: 14 BRPM | SYSTOLIC BLOOD PRESSURE: 118 MMHG | OXYGEN SATURATION: 98 %

## 2019-06-25 PROCEDURE — 3700000001 HC ADD 15 MINUTES (ANESTHESIA): Performed by: INTERNAL MEDICINE

## 2019-06-25 PROCEDURE — 7100000010 HC PHASE II RECOVERY - FIRST 15 MIN: Performed by: INTERNAL MEDICINE

## 2019-06-25 PROCEDURE — 2580000003 HC RX 258: Performed by: ANESTHESIOLOGY

## 2019-06-25 PROCEDURE — 7100000000 HC PACU RECOVERY - FIRST 15 MIN: Performed by: INTERNAL MEDICINE

## 2019-06-25 PROCEDURE — 2500000003 HC RX 250 WO HCPCS

## 2019-06-25 PROCEDURE — 3700000000 HC ANESTHESIA ATTENDED CARE: Performed by: INTERNAL MEDICINE

## 2019-06-25 PROCEDURE — 3609009500 HC COLONOSCOPY DIAGNOSTIC OR SCREENING: Performed by: INTERNAL MEDICINE

## 2019-06-25 PROCEDURE — 7100000011 HC PHASE II RECOVERY - ADDTL 15 MIN: Performed by: INTERNAL MEDICINE

## 2019-06-25 PROCEDURE — 45378 DIAGNOSTIC COLONOSCOPY: CPT | Performed by: INTERNAL MEDICINE

## 2019-06-25 PROCEDURE — 74160 CT ABDOMEN W/CONTRAST: CPT

## 2019-06-25 PROCEDURE — 6360000002 HC RX W HCPCS

## 2019-06-25 PROCEDURE — 7100000001 HC PACU RECOVERY - ADDTL 15 MIN: Performed by: INTERNAL MEDICINE

## 2019-06-25 PROCEDURE — 2709999900 HC NON-CHARGEABLE SUPPLY: Performed by: INTERNAL MEDICINE

## 2019-06-25 PROCEDURE — 2580000003 HC RX 258: Performed by: INTERNAL MEDICINE

## 2019-06-25 PROCEDURE — 6360000004 HC RX CONTRAST MEDICATION: Performed by: INTERNAL MEDICINE

## 2019-06-25 RX ORDER — 0.9 % SODIUM CHLORIDE 0.9 %
80 INTRAVENOUS SOLUTION INTRAVENOUS ONCE
Status: COMPLETED | OUTPATIENT
Start: 2019-06-25 | End: 2019-06-25

## 2019-06-25 RX ORDER — PROPOFOL 10 MG/ML
INJECTION, EMULSION INTRAVENOUS PRN
Status: DISCONTINUED | OUTPATIENT
Start: 2019-06-25 | End: 2019-06-25 | Stop reason: SDUPTHER

## 2019-06-25 RX ORDER — 0.9 % SODIUM CHLORIDE 0.9 %
250 INTRAVENOUS SOLUTION INTRAVENOUS
Status: DISCONTINUED | OUTPATIENT
Start: 2019-06-25 | End: 2019-06-25 | Stop reason: HOSPADM

## 2019-06-25 RX ORDER — SODIUM CHLORIDE, SODIUM LACTATE, POTASSIUM CHLORIDE, CALCIUM CHLORIDE 600; 310; 30; 20 MG/100ML; MG/100ML; MG/100ML; MG/100ML
INJECTION, SOLUTION INTRAVENOUS CONTINUOUS
Status: DISCONTINUED | OUTPATIENT
Start: 2019-06-25 | End: 2019-06-25 | Stop reason: HOSPADM

## 2019-06-25 RX ORDER — SODIUM CHLORIDE 0.9 % (FLUSH) 0.9 %
10 SYRINGE (ML) INJECTION PRN
Status: DISCONTINUED | OUTPATIENT
Start: 2019-06-25 | End: 2019-06-25 | Stop reason: HOSPADM

## 2019-06-25 RX ORDER — LIDOCAINE HYDROCHLORIDE 10 MG/ML
INJECTION, SOLUTION EPIDURAL; INFILTRATION; INTRACAUDAL; PERINEURAL PRN
Status: DISCONTINUED | OUTPATIENT
Start: 2019-06-25 | End: 2019-06-25 | Stop reason: SDUPTHER

## 2019-06-25 RX ADMIN — LIDOCAINE HYDROCHLORIDE 50 MG: 10 INJECTION, SOLUTION EPIDURAL; INFILTRATION; INTRACAUDAL; PERINEURAL at 09:18

## 2019-06-25 RX ADMIN — Medication 10 ML: at 11:10

## 2019-06-25 RX ADMIN — SODIUM CHLORIDE 80 ML: 9 INJECTION, SOLUTION INTRAVENOUS at 11:09

## 2019-06-25 RX ADMIN — IOVERSOL 75 ML: 741 INJECTION INTRA-ARTERIAL; INTRAVENOUS at 11:09

## 2019-06-25 RX ADMIN — SODIUM CHLORIDE, POTASSIUM CHLORIDE, SODIUM LACTATE AND CALCIUM CHLORIDE: 600; 310; 30; 20 INJECTION, SOLUTION INTRAVENOUS at 07:36

## 2019-06-25 RX ADMIN — PROPOFOL 470 MG: 10 INJECTION, EMULSION INTRAVENOUS at 09:18

## 2019-06-25 ASSESSMENT — PULMONARY FUNCTION TESTS
PIF_VALUE: 1
PIF_VALUE: 0
PIF_VALUE: 1
PIF_VALUE: 0
PIF_VALUE: 1
PIF_VALUE: 1
PIF_VALUE: 0
PIF_VALUE: 1
PIF_VALUE: 0
PIF_VALUE: 1
PIF_VALUE: 0
PIF_VALUE: 1

## 2019-06-25 ASSESSMENT — PAIN SCALES - GENERAL
PAINLEVEL_OUTOF10: 0
PAINLEVEL_OUTOF10: 10
PAINLEVEL_OUTOF10: 10

## 2019-06-25 ASSESSMENT — PAIN - FUNCTIONAL ASSESSMENT: PAIN_FUNCTIONAL_ASSESSMENT: 0-10

## 2019-06-25 NOTE — OP NOTE
PROCEDURE NOTE    DATE OF PROCEDURE: 6/25/2019    SURGEON: Cholo Sam MD  Facility : [facility]  ASSISTANT: None  Anesthesia: MAc  PREOPERATIVE DIAGNOSIS:   abnormal ct abd    abd  Pain    BBPR    POSTOPERATIVE DIAGNOSIS: as described below    OPERATION: Total colonoscopy     ANESTHESIA: Moderate Sedation    ESTIMATED BLOOD LOSS: less than 50     COMPLICATIONS: None. SPECIMENS:  Was Not Obtained    HISTORY: The patient is a 25y.o. year old male with history of above preop diagnosis. I recommended colonoscopy with possible biopsy or polypectomy and I explained the risk, benefits, expected outcome, and alternatives to the procedure. Risks included but are not limited to bleeding, infection, respiratory distress, hypotension, and perforation of the colon and possibility of missing a lesion. The patient understands and is in agreement. PROCEDURE: The patient was given IV conscious sedation. The patient's SPO2 remained above 90% throughout the procedure. The colonoscope was inserted per rectum and advanced under direct vision to the cecum without difficulty. Post sedation note : The patient's SPO2 remained above 90% throughout the procedure. the vital signs remained stable , and no immediate complication form the procedure noted, patient will be ready for d/c when criteria is met . The prep was good. Findings:  Terminal ileum: normal    Cecum/Ascending colon: normal    Transverse colon: normal    Descending/Sigmoid colon: normal    Rectum/Anus: examined in normal and retroflexed positions and was abnormal:  Large internal Hemorrhoids     Withdrawal Time was (minutes): 11    The colon was decompressed and the scope was removed. The patient tolerated the procedure well. Recommendations/Plan:   1. Lifestyle and dietary modifications as discussed  2. F/U Biopsies  3. F/U In OfficeYes  4. Discussed with the family  5.  Repeat colonoscopy [id number]    Electronically signed by Suzie Montes MD  on 6/25/2019 at 9:51 AM

## 2019-06-25 NOTE — ANESTHESIA POSTPROCEDURE EVALUATION
Department of Anesthesiology  Postprocedure Note    Patient: Robin Tariq  MRN: [id number]  YOB: 1990  Date of evaluation: 6/25/2019  Time:  12:21 PM     Procedure Summary     Date:  06/25/19 Room / Location:  [address] [place] [address] ENDO 03 / [facility] ENDO    Anesthesia Start:  0915 Anesthesia Stop:  5602    Procedure:  COLONOSCOPY DIAGNOSTIC (N/A ) Diagnosis:  (RECTAL BLEEDING  PAT ON ADMIT PER ANES)    Surgeon:  Gisel Turk MD Responsible Provider:  Temi Urias MD    Anesthesia Type:  MAC ASA Status:  1          Anesthesia Type: MAC    Ruth Phase I: Ruth Score: 8    Ruth Phase II:      Last vitals: Reviewed and per EMR flowsheets.        Anesthesia Post Evaluation    Comments: POST- ANESTHESIA EVALUATION       Pt Name: Robin Tariq  MRN: [id number]  YOB: 1990  Date of evaluation: 6/25/2019  Time:  12:21 PM      /64   Pulse 72   Temp 97.9 °F (36.6 °C) Comment: returned from x-ray   Resp 14   Ht 5' 8\" (1.727 m)   Wt 200 lb (90.7 kg)   SpO2 98%   BMI 30.41 kg/m²      Consciousness Level  Awake  Cardiopulmonary Status  Stable  Pain Adequately Treated YES  Nausea / Vomiting  NO  Adequate Hydration  YES  Anesthesia Related Complications NONE      Electronically signed by Temi Urias MD on 6/25/2019 at 12:21 PM

## 2019-06-25 NOTE — INTERVAL H&P NOTE
HISTORY and [facility] [address]       NAME:  Kenyatta Zimmerman  MRN: [id number]   YOB: 1990   Date: 6/25/2019   Age: 25 y.o. Gender: male     H&P Update Note    H&P from 06/07/2019  reviewed and updated, Patient examined. Vitals: /79   Pulse 73   Temp 97.3 °F (36.3 °C) (Oral)   Resp 16   Ht 5' 8\" (1.727 m)   Wt 200 lb (90.7 kg)   SpO2 97%   BMI 30.41 kg/m²  Body mass index is 30.41 kg/m². No interval changes. Pt is here for a colonoscopy. I concur with the findings.        ROSA MORELAND PA-C on 6/25/2019 at 7:24 AM

## 2019-06-25 NOTE — ANESTHESIA PRE PROCEDURE
Department of Anesthesiology  Preprocedure Note       Name:  Charity Castle   Age:  25 y.o.  :  1990                                          MRN:  [id number]         Date:  2019      Surgeon: Pranav Garcia):  Camryn Naranjo MD    Procedure: COLONOSCOPY DIAGNOSTIC (N/A )    Medications prior to admission:   Prior to Admission medications    Medication Sig Start Date End Date Taking? Authorizing Provider   dicyclomine (BENTYL) 20 MG tablet Take 1 tablet by mouth 3 times daily as needed (abdominal cramping) 19   Anna Marie Castellon MD   Na Sulfate-K Sulfate-Mg Sulf 17.5-3.13-1.6 GM/177ML SOLN Use as directed in your patient instructions. 6/10/19   Lynnette Raymond MD   omeprazole (PRILOSEC) 20 MG delayed release capsule Take 1 capsule by mouth Daily 19   Tawana Vincent MD       Current medications:    No current facility-administered medications for this encounter. Allergies:  No Known Allergies    Problem List:    Patient Active Problem List   Diagnosis Code    GI bleed K92.2    Abdominal lymphadenopathy R59.0    Rectal bleed K62.5    Abdominal pain R10.9    Rectal pain K62.89       Past Medical History:  History reviewed. No pertinent past medical history. Past Surgical History:        Procedure Laterality Date    KNEE ARTHROSCOPY      TONSILLECTOMY  2017       Social History:    Social History     Tobacco Use    Smoking status: Never Smoker    Smokeless tobacco: Never Used   Substance Use Topics    Alcohol use: Yes     Alcohol/week: 1.8 oz     Types: 3 Cans of beer per week                                Counseling given: Not Answered      Vital Signs (Current): There were no vitals filed for this visit.                                            BP Readings from Last 3 Encounters:   19 106/76   19 (!) 142/64   17 86/66       NPO Status:                                                                                 BMI:   Wt Readings from Last 3 Encounters: 06/12/19 198 lb (89.8 kg)   06/06/19 201 lb 11.5 oz (91.5 kg)   08/02/17 190 lb (86.2 kg)     There is no height or weight on file to calculate BMI.    CBC:   Lab Results   Component Value Date    WBC 4.7 06/07/2019    RBC 4.51 06/07/2019    HGB 13.4 06/07/2019    HCT 38.0 06/07/2019    MCV 84.3 06/07/2019    RDW 13.3 06/07/2019     06/07/2019       CMP:   Lab Results   Component Value Date     06/07/2019    K 3.8 06/07/2019     06/07/2019    CO2 26 06/07/2019    BUN 15 06/07/2019    CREATININE 1.16 06/07/2019    GFRAA >60 06/07/2019    LABGLOM >60 06/07/2019    GLUCOSE 99 06/07/2019    PROT 7.6 06/06/2019    CALCIUM 8.8 06/07/2019    BILITOT 0.53 06/06/2019    ALKPHOS 74 06/06/2019    AST 28 06/06/2019    ALT 29 06/06/2019       POC Tests: No results for input(s): POCGLU, POCNA, POCK, POCCL, POCBUN, POCHEMO, POCHCT in the last 72 hours. Coags:   Lab Results   Component Value Date    PROTIME 13.8 06/07/2019    INR 1.1 06/07/2019    APTT 27.2 06/06/2019       HCG (If Applicable): No results found for: PREGTESTUR, PREGSERUM, HCG, HCGQUANT     ABGs: No results found for: PHART, PO2ART, MIK2EZM, JWY6TOL, BEART, T9OWKXTA     Type & Screen (If Applicable):  No results found for: LABABO, [address]    Anesthesia Evaluation  Patient summary reviewed and Nursing notes reviewed no history of anesthetic complications:   Airway: Mallampati: I  TM distance: >3 FB   Neck ROM: full  Mouth opening: > = 3 FB Dental: normal exam         Pulmonary:normal exam                               Cardiovascular:Negative CV ROS                      Neuro/Psych:   Negative Neuro/Psych ROS              GI/Hepatic/Renal: Neg GI/Hepatic/Renal ROS            Endo/Other: Negative Endo/Other ROS                    Abdominal:           Vascular:                                        Anesthesia Plan      MAC     ASA 1             Anesthetic plan and risks discussed with patient. Plan discussed with CRNA.                   Dora Bajwa Nathalia Villareal MD   6/25/2019

## 2019-06-28 ENCOUNTER — TELEPHONE (OUTPATIENT)
Dept: GASTROENTEROLOGY | Age: 29
End: 2019-06-28

## 2020-01-10 ENCOUNTER — APPOINTMENT (OUTPATIENT)
Dept: GENERAL RADIOLOGY | Age: 30
End: 2020-01-10
Payer: COMMERCIAL

## 2020-01-10 ENCOUNTER — HOSPITAL ENCOUNTER (EMERGENCY)
Age: 30
Discharge: HOME | End: 2020-01-10
Attending: EMERGENCY MEDICINE
Payer: COMMERCIAL

## 2020-01-10 VITALS
RESPIRATION RATE: 22 BRPM | HEART RATE: 91 BPM | OXYGEN SATURATION: 99 % | SYSTOLIC BLOOD PRESSURE: 122 MMHG | BODY MASS INDEX: 30.31 KG/M2 | DIASTOLIC BLOOD PRESSURE: 94 MMHG | HEIGHT: 68 IN | TEMPERATURE: 98 F | WEIGHT: 200 LBS

## 2020-01-10 PROCEDURE — 6360000002 HC RX W HCPCS: Performed by: EMERGENCY MEDICINE

## 2020-01-10 PROCEDURE — 96375 TX/PRO/DX INJ NEW DRUG ADDON: CPT

## 2020-01-10 PROCEDURE — 96374 THER/PROPH/DIAG INJ IV PUSH: CPT

## 2020-01-10 PROCEDURE — 73030 X-RAY EXAM OF SHOULDER: CPT

## 2020-01-10 PROCEDURE — 99283 EMERGENCY DEPT VISIT LOW MDM: CPT

## 2020-01-10 RX ORDER — IBUPROFEN 800 MG/1
800 TABLET ORAL EVERY 8 HOURS PRN
Qty: 30 TABLET | Refills: 0 | Status: SHIPPED | OUTPATIENT
Start: 2020-01-10 | End: 2020-11-19

## 2020-01-10 RX ORDER — ACETAMINOPHEN 500 MG
1000 TABLET ORAL EVERY 8 HOURS PRN
Qty: 30 TABLET | Refills: 0 | Status: SHIPPED | OUTPATIENT
Start: 2020-01-10 | End: 2020-11-19

## 2020-01-10 RX ORDER — KETOROLAC TROMETHAMINE 30 MG/ML
30 INJECTION, SOLUTION INTRAMUSCULAR; INTRAVENOUS ONCE
Status: COMPLETED | OUTPATIENT
Start: 2020-01-10 | End: 2020-01-10

## 2020-01-10 RX ORDER — MORPHINE SULFATE 4 MG/ML
4 INJECTION, SOLUTION INTRAMUSCULAR; INTRAVENOUS ONCE
Status: COMPLETED | OUTPATIENT
Start: 2020-01-10 | End: 2020-01-10

## 2020-01-10 RX ADMIN — KETOROLAC TROMETHAMINE 30 MG: 30 INJECTION, SOLUTION INTRAMUSCULAR at 05:37

## 2020-01-10 RX ADMIN — MORPHINE SULFATE 4 MG: 4 INJECTION, SOLUTION INTRAMUSCULAR; INTRAVENOUS at 04:19

## 2020-01-10 ASSESSMENT — PAIN SCALES - GENERAL
PAINLEVEL_OUTOF10: 7
PAINLEVEL_OUTOF10: 8
PAINLEVEL_OUTOF10: 8

## 2020-01-10 ASSESSMENT — PAIN DESCRIPTION - ORIENTATION: ORIENTATION: RIGHT

## 2020-01-10 ASSESSMENT — PAIN DESCRIPTION - FREQUENCY: FREQUENCY: CONTINUOUS

## 2020-01-10 ASSESSMENT — PAIN DESCRIPTION - PAIN TYPE: TYPE: ACUTE PAIN

## 2020-01-10 ASSESSMENT — PAIN DESCRIPTION - DESCRIPTORS: DESCRIPTORS: SHARP

## 2020-01-10 ASSESSMENT — PAIN DESCRIPTION - LOCATION: LOCATION: SHOULDER

## 2020-01-10 NOTE — ED NOTES
Bed: 08  Expected date:   Expected time:   Means of arrival:   Comments:  Luly Moore, [facility]0 [facility]  01/10/20 [id number]

## 2020-01-10 NOTE — ED NOTES
Pt discharged in stable condition. Pt advised to follow up with PCP and return to ED if symptoms worsen. Pt is AOx4 and answering questions appropriately. Skin is PWD. Pt has steady gait upon discharge.       Tasia Nguyen RN  01/10/20 [id number]

## 2020-01-11 ASSESSMENT — ENCOUNTER SYMPTOMS
FACIAL SWELLING: 0
BACK PAIN: 0
SHORTNESS OF BREATH: 0
NAUSEA: 0
COLOR CHANGE: 0
COUGH: 0
VOMITING: 0
PHOTOPHOBIA: 0

## 2020-01-11 NOTE — ED PROVIDER NOTES
[address] ED  Emergency Department Encounter  Emergency Medicine Attending Note     Pt Name: Alecia Syed  MRN: [id number]  [place] 1990   Date of evaluation: 1/10/2020  PCP:  Vicky Corea MD    CHIEF COMPLAINT       Chief Complaint   Patient presents with    Shoulder Injury       HISTORY OF PRESENT ILLNESS  (Location/Symptom, Timing/Onset, Context/Setting, Quality, Duration, Modifying Factors, Severity.)      Alecia Syed is a 30 y.o. male who presents with right shoulder pain after falling off a ladder at work. Landed weirdly onto the right shoulder into a mud puddle. States that he felt like it may have been out of place and that he felt like it then \"popped\" back into place. Now is extremely painful. No numbness or weakness of the arm. Did not strike his head. No LOC Pain is 8/10. PAST MEDICAL / SURGICAL / SOCIAL / FAMILY HISTORY     Past Medical History:  has a past medical history of Abdominal pain and Rectal bleeding. Past Surgical History:  has a past surgical history that includes Knee arthroscopy; Tonsillectomy (07/14/2017); and Colonoscopy (N/A, 6/25/2019). =    Allergies:  Patient has no known allergies. Home Meds:   Prior to Visit Medications    Medication Sig Taking? Authorizing Provider   ibuprofen (ADVIL;MOTRIN) 800 MG tablet Take 1 tablet by mouth every 8 hours as needed for Pain Yes Stephani Haro MD   acetaminophen (TYLENOL) 500 MG tablet Take 2 tablets by mouth every 8 hours as needed for Pain or Fever Yes Stephani Haro MD   dicyclomine (BENTYL) 20 MG tablet Take 1 tablet by mouth 3 times daily as needed (abdominal cramping)  Vicky Corea MD   Na Sulfate-K Sulfate-Mg Sulf 17.5-3.13-1.6 GM/177ML SOLN Use as directed in your patient instructions.   Lynnette Raymond MD   omeprazole (PRILOSEC) 20 MG delayed release capsule Take 1 capsule by mouth Daily  Julianne Casper MD     Please note that medications prescribed at discharge rigidity or muscular tenderness. Cardiovascular:      Rate and Rhythm: Normal rate and regular rhythm. Pulses: Normal pulses. Heart sounds: No murmur. No friction rub. Pulmonary:      Effort: Pulmonary effort is normal. No respiratory distress. Breath sounds: Normal breath sounds. No stridor. No rhonchi. Musculoskeletal:      Comments: Right shoulder with tenderness over the [facility] joint. There is edema over the humeral head. No loss of bicep contour. Able to lift to ~ 90 degrees with pain. Normal ROM of right elbow and wrist.    Skin:     General: Skin is warm and dry. Capillary Refill: Capillary refill takes less than 2 seconds. Neurological:      General: No focal deficit present. Mental Status: He is alert and oriented to person, place, and time. Sensory: No sensory deficit (specifically normal sensation over the axillary nerve distribution). Motor: No weakness. Coordination: Coordination normal.         DIFFERENTIAL DIAGNOSIS/IMPRESSION     DDX: dislocatin, fracture, strain,  AC joint injury     Impression: 30 y.o. male who presents with right shoulder pain after a fall. Patient feels like he popped it in place. On exam has tenderness over the anterior part of the shoulder with some swelling over the humeral head. Neurovascularly intact. Will give analgesics and xray. DIAGNOSTIC RESULTS     EKG: All EKG's are interpreted by the Emergency Department Physician who either signs or Co-signs this chart in the absence of a cardiologist.    Not clinically indicated at this time. LABS: Labswere reviewed by me and abnormal results are displayed above     Labs Reviewed - No data to display    RADIOLOGY: All plain film, CT, MRI, and formal ultrasound images (except ED bedside ultrasound) are read by the radiologist, see reports below, unless otherwise noted in ED Course, MDM or here.     Xr Shoulder Right (min 2 Views)    Result Date: 1/10/2020  EXAMINATION:

## 2020-01-14 ENCOUNTER — HOSPITAL ENCOUNTER (OUTPATIENT)
Dept: CT IMAGING | Age: 30
Discharge: HOME | End: 2020-01-16
Payer: COMMERCIAL

## 2020-01-14 PROCEDURE — 70450 CT HEAD/BRAIN W/O DYE: CPT

## 2020-02-12 ENCOUNTER — HOSPITAL ENCOUNTER (OUTPATIENT)
Dept: CT IMAGING | Age: 30
Discharge: HOME | End: 2020-02-14
Payer: COMMERCIAL

## 2020-02-12 PROCEDURE — 70450 CT HEAD/BRAIN W/O DYE: CPT

## 2020-02-17 ENCOUNTER — HOSPITAL ENCOUNTER (EMERGENCY)
Age: 30
Discharge: HOME | End: 2020-02-17
Attending: EMERGENCY MEDICINE
Payer: COMMERCIAL

## 2020-02-17 VITALS
OXYGEN SATURATION: 97 % | SYSTOLIC BLOOD PRESSURE: 136 MMHG | HEIGHT: 68 IN | TEMPERATURE: 98.1 F | WEIGHT: 200 LBS | BODY MASS INDEX: 30.31 KG/M2 | HEART RATE: 99 BPM | RESPIRATION RATE: 14 BRPM | DIASTOLIC BLOOD PRESSURE: 92 MMHG

## 2020-02-17 PROCEDURE — 6360000002 HC RX W HCPCS: Performed by: EMERGENCY MEDICINE

## 2020-02-17 PROCEDURE — 6370000000 HC RX 637 (ALT 250 FOR IP): Performed by: EMERGENCY MEDICINE

## 2020-02-17 PROCEDURE — 99283 EMERGENCY DEPT VISIT LOW MDM: CPT

## 2020-02-17 PROCEDURE — 96372 THER/PROPH/DIAG INJ SC/IM: CPT

## 2020-02-17 RX ORDER — ONDANSETRON 4 MG/1
4 TABLET, ORALLY DISINTEGRATING ORAL ONCE
Status: COMPLETED | OUTPATIENT
Start: 2020-02-17 | End: 2020-02-17

## 2020-02-17 RX ORDER — KETOROLAC TROMETHAMINE 30 MG/ML
30 INJECTION, SOLUTION INTRAMUSCULAR; INTRAVENOUS ONCE
Status: COMPLETED | OUTPATIENT
Start: 2020-02-17 | End: 2020-02-17

## 2020-02-17 RX ORDER — DIPHENHYDRAMINE HYDROCHLORIDE 50 MG/ML
25 INJECTION INTRAMUSCULAR; INTRAVENOUS ONCE
Status: COMPLETED | OUTPATIENT
Start: 2020-02-17 | End: 2020-02-17

## 2020-02-17 RX ORDER — HYDROCODONE BITARTRATE AND ACETAMINOPHEN 5; 325 MG/1; MG/1
1 TABLET ORAL ONCE
Status: COMPLETED | OUTPATIENT
Start: 2020-02-17 | End: 2020-02-17

## 2020-02-17 RX ADMIN — DIPHENHYDRAMINE HYDROCHLORIDE 25 MG: 50 INJECTION, SOLUTION INTRAMUSCULAR; INTRAVENOUS at 07:07

## 2020-02-17 RX ADMIN — KETOROLAC TROMETHAMINE 30 MG: 30 INJECTION, SOLUTION INTRAMUSCULAR at 07:07

## 2020-02-17 RX ADMIN — HYDROCODONE BITARTRATE AND ACETAMINOPHEN 1 TABLET: 5; 325 TABLET ORAL at 08:15

## 2020-02-17 RX ADMIN — ONDANSETRON 4 MG: 4 TABLET, ORALLY DISINTEGRATING ORAL at 07:07

## 2020-02-17 ASSESSMENT — ENCOUNTER SYMPTOMS
COLOR CHANGE: 0
ABDOMINAL PAIN: 0
BLOOD IN STOOL: 0
SORE THROAT: 0
SINUS PRESSURE: 0
TROUBLE SWALLOWING: 0
EYE DISCHARGE: 0
NAUSEA: 1
WHEEZING: 0
COUGH: 0
FACIAL SWELLING: 0
BACK PAIN: 0
VOMITING: 1
RHINORRHEA: 0
EYE PAIN: 0
EYE REDNESS: 0
DIARRHEA: 0
CHEST TIGHTNESS: 0
SHORTNESS OF BREATH: 0
CONSTIPATION: 0

## 2020-02-17 ASSESSMENT — PAIN DESCRIPTION - LOCATION: LOCATION: HEAD

## 2020-02-17 ASSESSMENT — PAIN SCALES - GENERAL
PAINLEVEL_OUTOF10: 9
PAINLEVEL_OUTOF10: 6

## 2020-02-17 ASSESSMENT — PAIN DESCRIPTION - PAIN TYPE: TYPE: ACUTE PAIN

## 2020-02-17 NOTE — ED PROVIDER NOTES
neck pain. Skin: Negative for color change, pallor, rash and wound. Neurological: Positive for headaches. Negative for dizziness, tremors, seizures, syncope, speech difficulty, weakness and numbness. Psychiatric/Behavioral: Negative for confusion, decreased concentration, hallucinations, self-injury, sleep disturbance and suicidal ideas. PAST MEDICAL HISTORY     Past Medical History:   Diagnosis Date    Abdominal pain     Rectal bleeding        SURGICAL HISTORY       Past Surgical History:   Procedure Laterality Date    COLONOSCOPY N/A 6/25/2019    COLONOSCOPY DIAGNOSTIC performed by Briseyda Alcala MD at [address] [place] [address]  07/14/2017       CURRENT MEDICATIONS       Current Discharge Medication List      CONTINUE these medications which have NOT CHANGED    Details   ibuprofen (ADVIL;MOTRIN) 800 MG tablet Take 1 tablet by mouth every 8 hours as needed for Pain  Qty: 30 tablet, Refills: 0      acetaminophen (TYLENOL) 500 MG tablet Take 2 tablets by mouth every 8 hours as needed for Pain or Fever  Qty: 30 tablet, Refills: 0      dicyclomine (BENTYL) 20 MG tablet Take 1 tablet by mouth 3 times daily as needed (abdominal cramping)  Qty: 60 tablet, Refills: 1    Associated Diagnoses: Abdominal pain, unspecified abdominal location; Abdominal bloating      Na Sulfate-K Sulfate-Mg Sulf 17.5-3.13-1.6 GM/177ML SOLN Use as directed in your patient instructions. Qty: 1 Bottle, Refills: 0      omeprazole (PRILOSEC) 20 MG delayed release capsule Take 1 capsule by mouth Daily  Qty: 15 capsule, Refills: 0             ALLERGIES     has No Known Allergies. SOCIAL HISTORY      reports that he has never smoked. He has never used smokeless tobacco. He reports current alcohol use of about 3.0 standard drinks of alcohol per week. He reports that he does not use drugs.     PHYSICAL EXAM     INITIAL VITALS: BP (!) 136/92   Pulse 99   Temp 98.1 °F (36.7 °C)   Resp 14   Ht 5' 8\" (1.727 m) Wt 200 lb (90.7 kg)   SpO2 97%   BMI 30.41 kg/m²      Physical Exam  Vitals signs and nursing note reviewed. Constitutional:       General: He is not in acute distress. Appearance: He is well-developed. He is not diaphoretic. HENT:      Head: Normocephalic and atraumatic. Eyes:      General: No scleral icterus. Right eye: No discharge. Left eye: No discharge. Conjunctiva/sclera: Conjunctivae normal.      Pupils: Pupils are equal, round, and reactive to light. Cardiovascular:      Rate and Rhythm: Normal rate and regular rhythm. Heart sounds: Normal heart sounds. No murmur. No friction rub. No gallop. Pulmonary:      Effort: Pulmonary effort is normal. No respiratory distress. Breath sounds: Normal breath sounds. No wheezing or rales. Chest:      Chest wall: No tenderness. Abdominal:      General: Bowel sounds are normal. There is no distension. Palpations: Abdomen is soft. There is no mass. Tenderness: There is no abdominal tenderness. There is no guarding or rebound. Musculoskeletal: Normal range of motion. General: No tenderness. Skin:     General: Skin is warm and dry. Coloration: Skin is not pale. Findings: No erythema or rash. Neurological:      Mental Status: He is alert and oriented to person, place, and time. Cranial Nerves: No cranial nerve deficit. Sensory: No sensory deficit. Motor: No weakness or abnormal muscle tone. Coordination: Coordination normal.      Deep Tendon Reflexes: Reflexes normal.   Psychiatric:         Behavior: Behavior normal.         Thought Content: Thought content normal.         Judgment: Judgment normal.         DIAGNOSTIC RESULTS     RADIOLOGY:All plain film, CT,MRI, and formal ultrasound images (except ED bedside ultrasound) are read by the radiologist and the interpretations are directly viewed by the emergency physician.          LABS: All lab results were reviewed by myself, and all abnormals are listed below. Labs Reviewed - No data to display      MEDICAL DECISION MAKING:     Patient has a severe postconcussive headache that is been going on for few weeks now. Patient is already set for good follow-up. This point time I will just try to treat his headache tonight and then have him follow back up with occupational health. EMERGENCY DEPARTMENT COURSE:   Vitals:    Vitals:    02/17/20 0658   BP: (!) 136/92   Pulse: 99   Resp: 14   Temp: 98.1 °F (36.7 °C)   SpO2: 97%   Weight: 200 lb (90.7 kg)   Height: 5' 8\" (1.727 m)       The patient was given the following medications while in the emergency department:  Orders Placed This Encounter   Medications    ketorolac (TORADOL) injection 30 mg    diphenhydrAMINE (BENADRYL) injection 25 mg    ondansetron (ZOFRAN-ODT) disintegrating tablet 4 mg    HYDROcodone-acetaminophen (NORCO) 5-325 MG per tablet 1 tablet       -------------------------  8:12 AM  Patient is reevaluated and states that his headache is not much better. We will give him a dose of Norco and then have him follow-up with his occupational therapy people    CONSULTS:  None    PROCEDURES:  None    FINAL IMPRESSION      1.  Post-concussion headache          DISPOSITION/PLAN   DISPOSITION Decision To Discharge 02/17/2020 08:11:49 AM      PATIENT REFERREDTO:  Gregg Taylor MD  [facility], [address] [facility] [id number] [phone]    In 1 week      [facility] ED  [address] [facility] [address]  [address] [facility]  [phone]    If symptoms worsen      DISCHARGEMEDICATIONS:  Current Discharge Medication List          (Please note that portions of this note were completed with a voice recognition program.  Efforts were made to edit thedictations but occasionally words are mis-transcribed.)    Alex Martel MD  Attending Emergency Physician                       Alex Martel MD  02/17/20 0105 [Negative] : Genitourinary

## 2020-02-18 ENCOUNTER — HOSPITAL ENCOUNTER (OUTPATIENT)
Dept: MRI IMAGING | Facility: CLINIC | Age: 30
Discharge: HOME | End: 2020-02-20
Payer: COMMERCIAL

## 2020-02-18 PROCEDURE — 70551 MRI BRAIN STEM W/O DYE: CPT

## 2020-02-20 ENCOUNTER — OFFICE VISIT (OUTPATIENT)
Dept: NEUROLOGY | Age: 30
End: 2020-02-20
Payer: COMMERCIAL

## 2020-02-20 VITALS
HEIGHT: 68 IN | DIASTOLIC BLOOD PRESSURE: 79 MMHG | HEART RATE: 64 BPM | WEIGHT: 213.4 LBS | SYSTOLIC BLOOD PRESSURE: 121 MMHG | BODY MASS INDEX: 32.34 KG/M2

## 2020-02-20 PROCEDURE — 99204 OFFICE O/P NEW MOD 45 MIN: CPT | Performed by: PSYCHIATRY & NEUROLOGY

## 2020-02-20 RX ORDER — AMITRIPTYLINE HYDROCHLORIDE 25 MG/1
25 TABLET, FILM COATED ORAL NIGHTLY
Qty: 30 TABLET | Refills: 2 | Status: SHIPPED | OUTPATIENT
Start: 2020-02-20 | End: 2020-03-13 | Stop reason: SDUPTHER

## 2020-02-20 NOTE — PROGRESS NOTES
all joints  Skin: Intact, normal skin color    Neurological examination:    Mental status   Alert and oriented; intact memory with no confusion, speech or language problems; no hallucinations or delusions     Cranial nerves   II - visual fields intact to confrontation                                                III, IV, VI - extra-ocular muscles full: no pupillary defect; no DEUCE, no nystagmus, no ptosis   V - normal facial sensation                                                               VII - normal facial symmetry                                                             VIII - intact hearing                                                                             IX, X - symmetrical palate                                                                  XI - symmetrical shoulder shrug                                                       XII - midline tongue without atrophy or fasciculation     Motor function  Normal muscle bulk and tone; normal power 5/5, including fine motor movements     Sensory function Intact to touch, pin, vibration, proprioception     Cerebellar Intact fine motor movement. No involuntary movements or tremors     Reflex function Intact 2+ DTR and symmetric. Negative Babinski     Gait                  Normal station and gait       Lab Results   Component Value Date    LDLCALC 119 03/22/2017     No components found for: CHLPL  Lab Results   Component Value Date    TRIG 53 03/22/2017    TRIG 59 04/27/2016     Lab Results   Component Value Date    HDL 58 03/22/2017    HDL 65 04/27/2016     Lab Results   Component Value Date    LDLCALC 119 03/22/2017    [address] 04/27/2016     No results found for: LABVLDL  No results found for: LABA1C  No results found for: EAG  No results found for: Terry Bishop     All of patient's labs were personally reviewed. All the imaging studies were personally reviewed and discussed with the patient.      Assessment Recommendations:  ***    He will No follow-ups on file. or sooner if the patient's symptoms worsen or if there are any side effects. Earlie Cheadle, MD I would like to thank you for the consult. Please do not hesitate if you have any questions about the patient care.      Stephanie Carrion MD  Neurology       ***

## 2020-02-20 NOTE — PROGRESS NOTES
[facility], [address], [address] [facility]  Ph: [phone] or [phone]  FAX: [phone]    Chief Complaint: post concussion syndrome     Dear Masood Riggins MD     I had the pleasure of seeing your patient today in neurology consultation for his symptoms. As you would recall Jonathan Campbell is a 30 y.o. male. The history has been obtained from the patient and from the accompanying medical records. The patient comes in with his wife today. He does not have a history of headache and was at his baseline until six weeks ago. At that time while at work at [employer], the patient slipped and fell off a ladder from about three feet above the ground. He landed on the right side of his body, and his head hit the concrete. He immediately lost consciousness, and only has some pieces of memory after waking up. He remembers waking up with shoulder pain due to dislocation. He had an MRI of the brain on 2/10/2020 which was normal. Presently, the patient reports constant headache throughout day and night, worse upon awakening. Headaches occur constantly in the occipital area, with pressure spreading to the temples when they are worse. They are associated with photophobia and phonophobia. He needs to take Tylenol 1-2 times daily which grants pain relief. In addition, the patient thinks he has been sleeping more not, and reports unrefreshing sleep. His wife believes the patient has been having some issue with short term memory, and has been irritable. Otherwise, the patient denies neck pain, dizziness, tinnitus, and difficulty with concentration. 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 He is a non-smoker, does not drink alcohol, and denies recreational drug use.                            REVIEW OF SYSTEMS     Constitutional Weight: absent, Appetite: absent, Fatigue: absent   HEENT Visual disturbance: absent, Ears: normal   Respiratory Shortness of breath: absent, Cough: absent   Cardiovascular Chest pain: absent, Leg swelling :absent   GI Constipation: absent, Diarrhea: absent, Swallowing change: absent    Urinary frequency: absent, Urinary urgency: absent,    Musculoskeletal Neck pain: absent, Back pain: absent, Stiffness: absent, Muscle pain: absent, Joint pain: absent   Dermatological Hair loss: absent, Skin changes: absent   Neurological Memory loss: present, Confusion: present, Seizures: absent, Trouble walking or imbalance: absent, Dizziness: present, Weakness: absent, Numbness: absent Tremor: absent, Spasm: absent, Speech difficulty: absent, Headache: present, Light sensitivity: present   Psychiatric Anxiety: present, Hallucination: absent, Depression, absent   Hematologic Abnormal bleeding/Bruising: absent, Anemia: absent       Neurological work up:  CT head  CTA head and neck  MRI brain   2 D echo     Past Medical History:   Diagnosis Date    Abdominal pain     Rectal bleeding      Past Surgical History:   Procedure Laterality Date    COLONOSCOPY N/A 6/25/2019    [facility]  [facility]     No Known Allergies  History reviewed. No pertinent family history. Social History     Socioeconomic History    Marital status:      Spouse name: Not on file    Number of children: Not on file    Years of education: Not on file    Highest education level: Not on file   Occupational History    Not on file   Social Needs    Financial resource strain: Not on file    Food insecurity:     Worry: Not on file     Inability: Not on file    Transportation needs:     Medical: Not on file     Non-medical: Not on file   Tobacco Use    Smoking status: Never Smoker    Smokeless tobacco: Never Used   Substance and Sexual Activity    Alcohol use:  Yes     Alcohol/week: 3.0 standard drinks     Types: 3 intact hearing                                                                             IX, X - symmetrical palate                                                                  XI - symmetrical shoulder shrug                                                       XII - midline tongue without atrophy or fasciculation     Motor function  Normal muscle bulk and tone; normal power 5/5, including fine motor movements     Sensory function Intact to touch, pin, vibration, proprioception     Cerebellar Intact fine motor movement. No involuntary movements or tremors     Reflex function Intact 2+ DTR and symmetric. Negative Babinski     Gait                  Normal station and gait       Lab Results   Component Value Date    LDLCALC 119 03/22/2017     No components found for: CHLPL  Lab Results   Component Value Date    TRIG 53 03/22/2017    TRIG 59 04/27/2016     Lab Results   Component Value Date    HDL 58 03/22/2017    HDL 65 04/27/2016     Lab Results   Component Value Date    LDLCALC 119 03/22/2017    [address] 04/27/2016     No results found for: LABVLDL  No results found for: LABA1C  No results found for: EAG  No results found for: Grover Urrutia     All of patient's labs were personally reviewed. All the imaging studies were personally reviewed and discussed with the patient. Assessment Recommendations:  Post concussion syndrome    The patient sustained trauma to the head after a fall about ten days ago, and has been experiencing a constellation of symptoms including headaches, insomnia, mood change, and issue with short-term memory. MRI of the brain on 2/10/2020 has been normal. He has been taking Tylenol on a daily basis. No neck pain, dizziness, tinnitus. I would start the patient on low-dose amitriptyline 25 mg daily at nighttime, in hopes to help with his symptoms. We can wean it off once the patient recovers from the accident.  Medication side effects were discussed with the patient and questions were answered. He will Return in about 6 weeks (around 4/2/2020). or sooner if the patient's symptoms worsen or if there are any side effects. Phillip Kumar MD I would like to thank you for the consult. Please do not hesitate if you have any questions about the patient care. Scribe Attestation:   By signing my name below, Berny Medina, attest that this documentation has been prepared under the direction and in the presence of Thanh Israel MD.     Electronically Signed: Jacquelin Crow. 2/20/20. 10:57 AM.     Physician Attestation:   Jenn Jensen MD, personally performed the services described in this documentation. All medical record entries made by the scribe were at my direction and in my presence. I have reviewed the chart and discharge instructions (if applicable) and agree that the record reflects my personal performance and is accurate and complete.     Electronically Signed: Thomas Steen 2/24/2020 12:04 PM    Diplomate, American Board of Psychiatry and Neurology  Diplomate, American Board of Clinical Neurophysiology  Diplomate, American Board of Epilepsy

## 2020-02-20 NOTE — PROGRESS NOTES
REVIEW OF SYSTEMS    Constitutional Weight: absent, Appetite: absent, Fatigue: absent   HEENT Visual disturbance: absent, Ears: normal   Respiratory Shortness of breath: absent, Cough: absent   Cardiovascular Chest pain: absent, Leg swelling :absent   GI Constipation: absent, Diarrhea: absent, Swallowing change: absent    Urinary frequency: absent, Urinary urgency: absent,    Musculoskeletal Neck pain: absent, Back pain: absent, Stiffness: absent, Muscle pain: absent, Joint pain: absent   Dermatological Hair loss: absent, Skin changes: absent   Neurological Memory loss: present, Confusion: present, Seizures: absent, Trouble walking or imbalance: absent, Dizziness: present, Weakness: absent, Numbness: absent Tremor: absent, Spasm: absent, Speech difficulty: absent, Headache: present, Light sensitivity: present   Psychiatric Anxiety: present, Hallucination: absent, Depression, absent   Hematologic Abnormal bleeding/Bruising: absent, Anemia: absent

## 2020-02-28 ENCOUNTER — TELEPHONE (OUTPATIENT)
Dept: NEUROLOGY | Age: 30
End: 2020-02-28

## 2020-03-04 ENCOUNTER — TELEPHONE (OUTPATIENT)
Dept: NEUROLOGY | Age: 30
End: 2020-03-04

## 2020-03-04 NOTE — TELEPHONE ENCOUNTER
Dr. Boo Weiss office [facility], requesting a copy of the office visit from 2/20/2020. I confirmed referral from Dr. Staci Cueto to Dr. Stella Esparza and faxed the OV notes.

## 2020-03-05 ENCOUNTER — TELEPHONE (OUTPATIENT)
Dept: NEUROLOGY | Age: 30
End: 2020-03-05

## 2020-03-13 RX ORDER — AMITRIPTYLINE HYDROCHLORIDE 50 MG/1
50 TABLET, FILM COATED ORAL NIGHTLY
Qty: 30 TABLET | Refills: 2 | Status: SHIPPED | OUTPATIENT
Start: 2020-03-13 | End: 2020-11-19

## 2020-03-25 ENCOUNTER — TELEPHONE (OUTPATIENT)
Dept: NEUROLOGY | Age: 30
End: 2020-03-25

## 2020-03-25 NOTE — TELEPHONE ENCOUNTER
Pt's wife called in stating pt is doing really well with the medication and is asking to go back to work with no restrictions. Can we provide this letter?

## 2020-04-08 RX ORDER — NABUMETONE 750 MG/1
750 TABLET, FILM COATED ORAL 2 TIMES DAILY PRN
COMMUNITY
End: 2021-09-28

## 2020-04-08 NOTE — PROGRESS NOTES
[facility], [address], [address] [place] [address]  Ph: [phone] or [phone]  FAX: [phone]    Chief Complaint: post concussion syndrome     Dear Beto Venegas MD   This is a telemedicine visit via video. The patient is known to me from his previous clinic evaluation after the patient slipped at work resulting in a fall on the right side and hitting his head on the concrete. He developed symptoms of postconcussion syndrome. He was having headaches. He was put on amitriptyline 50 mg p.o. nightly. Overall he reports improvement in symptoms. The headaches are significantly improved and are less intense and less severe. He denies having any side effect with medications. He reports his sleep to be improved. No falls. No difficulty with memory. No mood changes. MRI scan of the brain in February 2020 was normal.      Neurological work up:  CT head  CTA head and neck  MRI brain   2 D echo     Past Medical History:   Diagnosis Date    Abdominal pain     Rectal bleeding      Past Surgical History:   Procedure Laterality Date    COLONOSCOPY N/A 6/25/2019    COLONOSCOPY DIAGNOSTIC performed by Trista Mata MD at [address] [place] [address]  07/14/2017     No Known Allergies  History reviewed. No pertinent family history. Social History     Socioeconomic History    Marital status:      Spouse name: Not on file    Number of children: Not on file    Years of education: Not on file    Highest education level: Not on file   Occupational History    Not on file   Social Needs    Financial resource strain: Not on file    Food insecurity     Worry: Not on file     Inability: Not on file    Transportation needs     Medical: Not on file     Non-medical: Not on file   Tobacco Use    Smoking status: Never Smoker    Smokeless tobacco: Never Used   Substance and Sexual Activity    Alcohol use:  Yes     Alcohol/week: 3.0 standard

## 2020-04-09 ENCOUNTER — TELEMEDICINE (OUTPATIENT)
Dept: NEUROLOGY | Age: 30
End: 2020-04-09
Payer: COMMERCIAL

## 2020-04-09 PROCEDURE — 99214 OFFICE O/P EST MOD 30 MIN: CPT | Performed by: PSYCHIATRY & NEUROLOGY

## 2020-04-13 ENCOUNTER — OFFICE VISIT (OUTPATIENT)
Dept: PRIMARY CARE CLINIC | Age: 30
End: 2020-04-13
Payer: COMMERCIAL

## 2020-04-13 ENCOUNTER — HOSPITAL ENCOUNTER (OUTPATIENT)
Age: 30
Setting detail: SPECIMEN
Discharge: HOME | End: 2020-04-13
Payer: COMMERCIAL

## 2020-04-13 ENCOUNTER — TELEPHONE (OUTPATIENT)
Dept: FAMILY MEDICINE CLINIC | Age: 30
End: 2020-04-13

## 2020-04-13 VITALS — TEMPERATURE: 100 F | OXYGEN SATURATION: 97 % | HEART RATE: 123 BPM

## 2020-04-13 PROCEDURE — 99213 OFFICE O/P EST LOW 20 MIN: CPT | Performed by: FAMILY MEDICINE

## 2020-04-13 ASSESSMENT — ENCOUNTER SYMPTOMS
RHINORRHEA: 1
VOMITING: 0
COUGH: 1
NAUSEA: 0
SINUS PAIN: 0
SHORTNESS OF BREATH: 1
DIARRHEA: 0
SORE THROAT: 1
WHEEZING: 0

## 2020-04-13 NOTE — PATIENT INSTRUCTIONS
You were tested for COVID today. We will call you with results when they are available. If you have not heard from us in 7 days, please call our office. Advance Care Planning  People with COVID-19 may have no symptoms, mild symptoms, such as fever, cough, and shortness of breath or they may have more severe illness, developing severe and fatal pneumonia. As a result, Advance Care Planning with attention to naming a health care decision maker (someone you trust to make healthcare decisions for you if you could not speak for yourself) and sharing other health care preferences is important BEFORE a possible health crisis. Please contact your Primary Care Provider to discuss Advance Care Planning. Preventing the Spread of Coronavirus Disease 2019 in Homes and Residential Communities  For the most recent information go to [email or web address]    Prevention steps for People with confirmed or suspected COVID-19 (including persons under investigation) who do not need to be hospitalized  and   People with confirmed COVID-19 who were hospitalized and determined to be medically stable to go home    Your healthcare provider and public health staff will evaluate whether you can be cared for at home. If it is determined that you do not need to be hospitalized and can be isolated at home, you will be monitored by staff from your local or state health department. You should follow the prevention steps below until a healthcare provider or local or state health department says you can return to your normal activities. Stay home except to get medical care  People who are mildly ill with COVID-19 are able to isolate at home during their illness. You should restrict activities outside your home, except for getting medical care. Do not go to work, school, or public areas. Avoid using public transportation, ride-sharing, or taxis.   Separate yourself from other people and animals in your home  People: As much as possible, you should stay in a specific room and away from other people in your home. Also, you should use a separate bathroom, if available. Animals: You should restrict contact with pets and other animals while you are sick with COVID-19, just like you would around other people. Although there have not been reports of pets or other animals becoming sick with COVID-19, it is still recommended that people sick with COVID-19 limit contact with animals until more information is known about the virus. When possible, have another member of your household care for your animals while you are sick. If you are sick with COVID-19, avoid contact with your pet, including petting, snuggling, being kissed or licked, and sharing food. If you must care for your pet or be around animals while you are sick, wash your hands before and after you interact with pets and wear a facemask. Call ahead before visiting your doctor  If you have a medical appointment, call the healthcare provider and tell them that you have or may have COVID-19. This will help the healthcare providers office take steps to keep other people from getting infected or exposed. Wear a facemask  You should wear a facemask when you are around other people (e.g., sharing a room or vehicle) or pets and before you enter a healthcare providers office. If you are not able to wear a facemask (for example, because it causes trouble breathing), then people who live with you should not stay in the same room with you, or they should wear a facemask if they enter your room. Cover your coughs and sneezes  Cover your mouth and nose with a tissue when you cough or sneeze. Throw used tissues in a lined trash can. Immediately wash your hands with soap and water for at least 20 seconds or, if soap and water are not available, clean your hands with an alcohol-based hand  that contains at least 60% alcohol.   Clean your hands often  Wash your hands often with soap and water for at least 20 seconds, especially after blowing your nose, coughing, or sneezing; going to the bathroom; and before eating or preparing food. If soap and water are not readily available, use an alcohol-based hand  with at least 60% alcohol, covering all surfaces of your hands and rubbing them together until they feel dry. Soap and water are the best option if hands are visibly dirty. Avoid touching your eyes, nose, and mouth with unwashed hands. Avoid sharing personal household items  You should not share dishes, drinking glasses, cups, eating utensils, towels, or bedding with other people or pets in your home. After using these items, they should be washed thoroughly with soap and water. Clean all high-touch surfaces everyday  High touch surfaces include counters, tabletops, doorknobs, bathroom fixtures, toilets, phones, keyboards, tablets, and bedside tables. Also, clean any surfaces that may have blood, stool, or body fluids on them. Use a household cleaning spray or wipe, according to the label instructions. Labels contain instructions for safe and effective use of the cleaning product including precautions you should take when applying the product, such as wearing gloves and making sure you have good ventilation during use of the product. Monitor your symptoms  Seek prompt medical attention if your illness is worsening (e.g., difficulty breathing). Before seeking care, call your healthcare provider and tell them that you have, or are being evaluated for, COVID-19. Put on a facemask before you enter the facility. These steps will help the healthcare providers office to keep other people in the office or waiting room from getting infected or exposed. Ask your healthcare provider to call the local or state health department.  Persons who are placed under active monitoring or facilitated self-monitoring should follow instructions provided by their local health department or occupational health professionals, as appropriate. When working with your local health department check their available hours. If you have a medical emergency and need to call 911, notify the dispatch personnel that you have, or are being evaluated for COVID-19. If possible, put on a facemask before emergency medical services arrive. Discontinuing home isolation  Patients with confirmed COVID-19 should remain under home isolation precautions until the risk of secondary transmission to others is thought to be low. The decision to discontinue home isolation precautions should be made on a case-by-case basis, in consultation with healthcare providers and state and local health departments.

## 2020-04-13 NOTE — LETTER
[address] [place] [address]  Phone: [phone]  Fax: [phone]    Echo Ferro MD        April 13, 2020     Patient: Jhoan Giron   YOB: 1990   Date of Visit: 4/13/2020       To Whom it May Concern: Benja Hummel was seen in my clinic on 4/13/2020. He may return to work on 4/17/20 assuming he meets the following criteria per CDC guidelines:    -no fever for at least 72 hours (that is three full days of no fever without the use medicine that reduces fevers)  -AND other symptoms have improved (for example, when your cough or shortness of breath have improved)  -AND at least 7 days have passed since your symptoms first appeared      If you have any questions or concerns, please don't hesitate to call.     Sincerely,           Echo Ferro MD

## 2020-04-13 NOTE — PROGRESS NOTES
Coronavirus Disease 2019 in Homes and Residential Communities  For the most recent information go to [email or web address]    Prevention steps for People with confirmed or suspected COVID-19 (including persons under investigation) who do not need to be hospitalized  and   People with confirmed COVID-19 who were hospitalized and determined to be medically stable to go home    Your healthcare provider and public health staff will evaluate whether you can be cared for at home. If it is determined that you do not need to be hospitalized and can be isolated at home, you will be monitored by staff from your local or state health department. You should follow the prevention steps below until a healthcare provider or local or state health department says you can return to your normal activities. Stay home except to get medical care  People who are mildly ill with COVID-19 are able to isolate at home during their illness. You should restrict activities outside your home, except for getting medical care. Do not go to work, school, or public areas. Avoid using public transportation, ride-sharing, or taxis. Separate yourself from other people and animals in your home  People: As much as possible, you should stay in a specific room and away from other people in your home. Also, you should use a separate bathroom, if available. Animals: You should restrict contact with pets and other animals while you are sick with COVID-19, just like you would around other people. Although there have not been reports of pets or other animals becoming sick with COVID-19, it is still recommended that people sick with COVID-19 limit contact with animals until more information is known about the virus. When possible, have another member of your household care for your animals while you are sick.  If you are sick with COVID-19, avoid contact with your pet, including petting, snuggling, being Placed This Encounter   Procedures    COVID-19     Standing Status:   Future     Standing Expiration Date:   4/13/2021     Scheduling Instructions:      1) Due to current limited availability of the COVID-19 test, tests will be prioritized based on responses to questions above. Testing may be delayed due to volume. 2) Print and instruct patient to adhere to [facility] home isolation program. (Link Above)              3) Set up or refer patient for a monitoring program.              4) Have patient sign up for and leverage MyChart (if not previously done). No orders of the defined types were placed in this encounter. Patient given educational materials - see patient instructions. Discussed use, benefit, and side effects of prescribed medications. All patient questions answered. Pt voiced understanding. Patient agreed with treatment plan. Follow up as directed.      Electronicallysigned by Amelia Owen MD on 4/13/2020 at 2:35 PM

## 2020-04-16 LAB
SARS-COV-2, NAA: NOT DETECTED
SARS-COV-2, PCR: NORMAL
SARS-COV-2: NORMAL
SOURCE: NORMAL

## 2020-05-20 ENCOUNTER — TELEMEDICINE (OUTPATIENT)
Dept: NEUROLOGY | Age: 30
End: 2020-05-20
Payer: COMMERCIAL

## 2020-05-20 PROCEDURE — 99214 OFFICE O/P EST MOD 30 MIN: CPT | Performed by: PSYCHIATRY & NEUROLOGY

## 2020-05-20 RX ORDER — TOPIRAMATE 25 MG/1
TABLET ORAL
Qty: 60 TABLET | Refills: 1 | Status: SHIPPED | OUTPATIENT
Start: 2020-05-20 | End: 2020-08-10 | Stop reason: SDUPTHER

## 2020-05-20 NOTE — PROGRESS NOTES
absent, Hallucination: absent, Depression, absent   Hematologic Abnormal bleeding/Bruising: absent, Anemia: absent       Neurological work up:  CT head  CTA head and neck  MRI scan of the brain in February 2020 was normal.  2 D echo     Past Medical History:   Diagnosis Date    Abdominal pain     Rectal bleeding      Past Surgical History:   Procedure Laterality Date    COLONOSCOPY N/A 6/25/2019    COLONOSCOPY DIAGNOSTIC performed by Clint Kumar MD at [address]   07/14/2017     No Known Allergies  No family history on file. Social History     Socioeconomic History    Marital status:      Spouse name: Not on file    Number of children: Not on file    Years of education: Not on file    Highest education level: Not on file   Occupational History    Not on file   Social Needs    Financial resource strain: Not on file    Food insecurity     Worry: Not on file     Inability: Not on file    Transportation needs     Medical: Not on file     Non-medical: Not on file   Tobacco Use    Smoking status: Never Smoker    Smokeless tobacco: Never Used   Substance and Sexual Activity    Alcohol use:  Yes     Alcohol/week: 3.0 standard drinks     Types: 3 Cans of beer per week    Drug use: No    Sexual activity: Not on file   Lifestyle    Physical activity     Days per week: Not on file     Minutes per session: Not on file    Stress: Not on file   Relationships    Social connections     Talks on phone: Not on file     Gets together: Not on file     Attends Buddhist service: Not on file     Active member of club or organization: Not on file     Attends meetings of clubs or organizations: Not on file     Relationship status: Not on file    Intimate partner violence     Fear of current or ex partner: Not on file     Emotionally abused: Not on file     Physically abused: Not on file     Forced sexual activity: Not on file   Other Topics Concern    Not on file   Social History Narrative    Not on file      There were no vitals taken for this visit. Physical examination:  General appearance: Normal. Well groomed.  In no acute distress    Head: Normocephalic, atraumatic  Eyes: Extraocular movements intact, eye lids normal  Lungs: Respirations unlabored, chest wall no deformity  ENT: Normal external ear canals, no sinus tenderness  Heart: Regular rate rhythm  Abdomen: No masses, tenderness  Extremities: No cyanosis or edema, 2+ pulses  Musculoskeletal: Normal range of motion in all joints  Skin: Intact, normal skin color    Neurological examination:    Mental status   Alert and oriented; intact memory with no confusion, speech or language problems; no hallucinations or delusions     Cranial nerves   II - visual fields intact to confrontation                                                III, IV, VI - extra-ocular muscles full: no pupillary defect; no DEUCE, no nystagmus, no ptosis   V - normal facial sensation                                                               VII - normal facial symmetry                                                             VIII - intact hearing                                                                             IX, X - symmetrical palate                                                                  XI - symmetrical shoulder shrug                                                       XII - midline tongue without atrophy or fasciculation     Motor function  Unable to assess   Sensory function Unable to assess   Cerebellar Unable to assess   Reflex function Unable to assess   Gait                  Unable to assess       Lab Results   Component Value Date    LDLCALC 119 03/22/2017     No components found for: CHLPL  Lab Results   Component Value Date    TRIG 53 03/22/2017    TRIG 59 04/27/2016     Lab Results   Component Value Date    HDL 58 03/22/2017    HDL 65 04/27/2016     Lab Results   Component Value Date [address] 119 03/22/2017    [address] 80 04/27/2016     No results found for: LABVLDL  No results found for: LABA1C  No results found for: EAG  No results found for: Rona Quintana     All of patient's labs were personally reviewed. All the imaging studies were personally reviewed and discussed with the patient. Assessment Recommendations:  Headaches, second to post concussion syndrome:  The patient is having a constant, tolerable headache after stopping amitriptyline one month ago. I will start him on topiramate 25 mg twice a day for headache prophylaxis. Medication side effects were discussed with the patient and questions were answered. I would like the patient to keep a headache log to record the days he has a headache, as well as its severity and associated symptoms. Mild memory impairment, second to post concussion syndrome:  The patient reports continuing to have some mild memory impairment. I believe this will continue to improve on its own over the next few weeks. The patient will follow-up in 4-6 weeks  or sooner if the patient's symptoms worsen or if there are any side effects. Scribe Attestation:   By signing my name below, Trip Baldemargary, attest that this documentation has been prepared under the direction and in the presence of Brittany Browning MD.     Electronically Signed: Jimmy Bishop. 5/20/20. 9:49 AM EDT. Physician Attestation:   Tom Delaney MD, personally performed the services described in this documentation. All medical record entries made by the scribe were at my direction and in my presence. I have reviewed the chart and discharge instructions (if applicable) and agree that the record reflects my personal performance and is accurate and complete.     Electronically Signed: Valdez Rogers 5/26/2020 9:22 AM    Diplomate, American Board of Psychiatry and Neurology  Diplomate, American Board of Clinical Neurophysiology  Diplomate, American Board of Epilepsy         This is a

## 2020-07-20 NOTE — PROGRESS NOTES
REVIEW OF SYSTEMS    Constitutional Weight: absent, Appetite: absent, Fatigue: absent   HEENT Visual disturbance: absent, Ears: normal   Respiratory Shortness of breath: absent, Cough: absent   Cardiovascular Chest pain: absent, Leg swelling :absent   GI Constipation: absent, Diarrhea: absent, Swallowing change: absent    Urinary frequency: absent, Urinary urgency: absent,    Musculoskeletal Neck pain: absent, Back pain: absent, Stiffness: absent, Muscle pain: absent, Joint pain: absent   Dermatological Hair loss: absent, Skin changes: absent   Neurological Memory loss: absent, Confusion: absent, Seizures: absent, Trouble walking or imbalance: absent, Dizziness: absent, Weakness: absent, Numbness: absent Tremor: absent, Spasm: absent, Speech difficulty: absent, Headache: present, Light sensitivity: absent   Psychiatric Anxiety: absent, Hallucination: absent, Depression, absent   Hematologic Abnormal bleeding/Bruising: absent, Anemia: absent

## 2020-07-21 ENCOUNTER — TELEMEDICINE (OUTPATIENT)
Dept: NEUROLOGY | Age: 30
End: 2020-07-21
Payer: COMMERCIAL

## 2020-07-21 PROCEDURE — 99213 OFFICE O/P EST LOW 20 MIN: CPT | Performed by: PSYCHIATRY & NEUROLOGY

## 2020-07-21 NOTE — PROGRESS NOTES
in February 2020 was normal.  2 D echo     Past Medical History:   Diagnosis Date    Abdominal pain     Head injury     Rectal bleeding      Past Surgical History:   Procedure Laterality Date    COLONOSCOPY N/A 6/25/2019    COLONOSCOPY DIAGNOSTIC performed by Sonya Angel MD at [address]   07/14/2017     No Known Allergies  History reviewed. No pertinent family history. Social History     Socioeconomic History    Marital status:      Spouse name: Not on file    Number of children: Not on file    Years of education: Not on file    Highest education level: Not on file   Occupational History    Not on file   Social Needs    Financial resource strain: Not on file    Food insecurity     Worry: Not on file     Inability: Not on file    Transportation needs     Medical: Not on file     Non-medical: Not on file   Tobacco Use    Smoking status: Never Smoker    Smokeless tobacco: Never Used   Substance and Sexual Activity    Alcohol use: Not Currently     Alcohol/week: 3.0 standard drinks     Types: 3 Cans of beer per week    Drug use: No    Sexual activity: Not on file   Lifestyle    Physical activity     Days per week: Not on file     Minutes per session: Not on file    Stress: Not on file   Relationships    Social connections     Talks on phone: Not on file     Gets together: Not on file     Attends Religion service: Not on file     Active member of club or organization: Not on file     Attends meetings of clubs or organizations: Not on file     Relationship status: Not on file    Intimate partner violence     Fear of current or ex partner: Not on file     Emotionally abused: Not on file     Physically abused: Not on file     Forced sexual activity: Not on file   Other Topics Concern    Not on file   Social History Narrative    Not on file      There were no vitals taken for this visit.                             Physical examination:  General appearance: Normal. Well groomed.  In no acute distress    Head: Normocephalic, atraumatic  Eyes: Extraocular movements intact, eye lids normal  Lungs: Respirations unlabored, chest wall no deformity  ENT: Normal external ear canals, no sinus tenderness  Heart: Regular rate rhythm  Abdomen: No masses, tenderness  Extremities: No cyanosis or edema, 2+ pulses  Musculoskeletal: Normal range of motion in all joints  Skin: Intact, normal skin color    Neurological examination:    Mental status   Alert and oriented; intact memory with no confusion, speech or language problems; no hallucinations or delusions     Cranial nerves   II - visual fields intact to confrontation                                                III, IV, VI - extra-ocular muscles full: no pupillary defect; no DEUCE, no nystagmus, no ptosis   V - normal facial sensation                                                               VII - normal facial symmetry                                                             VIII - intact hearing                                                                             IX, X - symmetrical palate                                                                  XI - symmetrical shoulder shrug                                                       XII - midline tongue without atrophy or fasciculation     Motor function  Unable to assess   Sensory function Unable to assess   Cerebellar Unable to assess   Reflex function Unable to assess   Gait                  Unable to assess       Lab Results   Component Value Date    LDLCALC 119 03/22/2017     No components found for: CHLPL  Lab Results   Component Value Date    TRIG 53 03/22/2017    TRIG 59 04/27/2016     Lab Results   Component Value Date    HDL 58 03/22/2017    HDL 65 04/27/2016     Lab Results   Component Value Date    LDLCALC 119 03/22/2017    [address] 80 04/27/2016     No results found for: LABVLDL  No results found for: LABA1C  No results found for: EAG  No results found for: Evaristo Potter     All of patient's labs were personally reviewed. All the imaging studies were personally reviewed and discussed with the patient. Assessment Recommendations:  Headaches, second to post concussion syndrome:    Today the patient reports significant headache reduction. I recommend to continue 25 mg twice a day. Medication side effects were discussed with the patient and questions were answered. Please maintain proper hydration to reduce the risk of renal calculi. The patient will follow-up in 4-6 months or sooner if the patient's symptoms worsen or if there are any side effects. Scribe Attestation:   By signing my name below, Valerie Hugo, attest that this documentation has been prepared under the direction and in the presence of Chel Hernandez MD.     Electronically Signed: Shera Seip. 7/21/20. 11:26 AM EDT. Physician Attestation:   Mauro Cevallos MD, personally performed the services described in this documentation. All medical record entries made by the scribe were at my direction and in my presence. I have reviewed the chart and discharge instructions (if applicable) and agree that the record reflects my personal performance and is accurate and complete. Electronically Signed: Kelsi Ruiz 7/23/2020 5:30 PM    Diplomate, American Board of Psychiatry and Neurology  Diplomate, American Board of Clinical Neurophysiology  Diplomate, American Board of Epilepsy               This is a telehealth visit that was performed with the originating site at Patient Location: Patient Home and Provider Location of [place], [place]. Patient ID verified by me prior to start of this visit. Verbal consent to participate in video visit was obtained.  Pursuant to the emergency declaration under the [facility], [address] [place] [address] waiver authority and the [employer] and [employer] Act, this Virtual Visit was

## 2020-08-10 NOTE — TELEPHONE ENCOUNTER
Patient is requesting refill of Topamax 25 mg.      Medication active on med list yes      Date last ordered: 5/20/2020  verified on 8/10/2020  verified by MICHAEL Day LPN      Date of last appointment 7/21/2020    Next Visit Date:  11/25/2020

## 2020-08-11 RX ORDER — TOPIRAMATE 25 MG/1
TABLET ORAL
Qty: 60 TABLET | Refills: 3 | Status: SHIPPED
Start: 2020-08-11 | End: 2020-12-18 | Stop reason: SINTOL

## 2020-10-12 ENCOUNTER — TELEPHONE (OUTPATIENT)
Dept: FAMILY MEDICINE CLINIC | Age: 30
End: 2020-10-12

## 2020-10-12 NOTE — TELEPHONE ENCOUNTER
Patients wife is symptomatic and was tested for Covid and is positive. His employer instructed him to call his PCP. I did explain that we cannot test him unless he becomes symptomatic, but they may have him quarantine for 10 days. I told him to call if he needs anything.

## 2020-10-16 ENCOUNTER — TELEPHONE (OUTPATIENT)
Dept: FAMILY MEDICINE CLINIC | Age: 30
End: 2020-10-16

## 2020-10-16 NOTE — TELEPHONE ENCOUNTER
Patient stated he is off work due to his wife having Covid and he had a test done Tuesday, still waiting for the results back. His work is faxing paperwork that needs filled out due to him being off for more than 3 days.

## 2020-10-21 ENCOUNTER — TELEPHONE (OUTPATIENT)
Dept: FAMILY MEDICINE CLINIC | Age: 30
End: 2020-10-21

## 2020-10-21 NOTE — TELEPHONE ENCOUNTER
Did we receive paperwork for his job since he tested positive for Covid.  He is re faxing it just in case it was not received

## 2020-11-13 ENCOUNTER — HOSPITAL ENCOUNTER (OUTPATIENT)
Age: 30
Discharge: HOME | End: 2020-11-13
Payer: COMMERCIAL

## 2020-11-13 LAB
ALBUMIN SERPL-MCNC: 4.5 G/DL (ref 3.5–5.2)
ALBUMIN/GLOBULIN RATIO: ABNORMAL (ref 1–2.5)
ALP BLD-CCNC: 74 U/L (ref 40–129)
ALT SERPL-CCNC: 41 U/L (ref 5–41)
ANION GAP SERPL CALCULATED.3IONS-SCNC: 12 MMOL/L (ref 9–17)
AST SERPL-CCNC: 49 U/L
BILIRUB SERPL-MCNC: 0.64 MG/DL (ref 0.3–1.2)
BUN BLDV-MCNC: 16 MG/DL (ref 6–20)
BUN/CREAT BLD: ABNORMAL (ref 9–20)
CALCIUM SERPL-MCNC: 9.3 MG/DL (ref 8.6–10.4)
CHLORIDE BLD-SCNC: 102 MMOL/L (ref 98–107)
CHOLESTEROL, FASTING: 176 MG/DL
CHOLESTEROL/HDL RATIO: 3.2
CO2: 25 MMOL/L (ref 20–31)
CREAT SERPL-MCNC: 1.3 MG/DL (ref 0.7–1.2)
GFR AFRICAN AMERICAN: >60 ML/MIN
GFR NON-AFRICAN AMERICAN: >60 ML/MIN
GFR SERPL CREATININE-BSD FRML MDRD: ABNORMAL ML/MIN/{1.73_M2}
GFR SERPL CREATININE-BSD FRML MDRD: ABNORMAL ML/MIN/{1.73_M2}
GLUCOSE BLD-MCNC: 83 MG/DL (ref 70–99)
HCT VFR BLD CALC: 40 % (ref 41–53)
HDLC SERPL-MCNC: 55 MG/DL
HEMOGLOBIN: 14.4 G/DL (ref 13.5–17.5)
LDL CHOLESTEROL: 113 MG/DL (ref 0–130)
MCH RBC QN AUTO: 30 PG (ref 26–34)
MCHC RBC AUTO-ENTMCNC: 35.9 G/DL (ref 31–37)
MCV RBC AUTO: 83.5 FL (ref 80–100)
NRBC AUTOMATED: ABNORMAL PER 100 WBC
PDW BLD-RTO: 12.7 % (ref 11.5–14.9)
PLATELET # BLD: 198 K/UL (ref 150–450)
PMV BLD AUTO: 7.5 FL (ref 6–12)
POTASSIUM SERPL-SCNC: 4.5 MMOL/L (ref 3.7–5.3)
RBC # BLD: 4.79 M/UL (ref 4.5–5.9)
SODIUM BLD-SCNC: 139 MMOL/L (ref 135–144)
TOTAL PROTEIN: 7.4 G/DL (ref 6.4–8.3)
TRIGLYCERIDE, FASTING: 39 MG/DL
VLDLC SERPL CALC-MCNC: NORMAL MG/DL (ref 1–30)
WBC # BLD: 3.6 K/UL (ref 3.5–11)

## 2020-11-13 PROCEDURE — 80061 LIPID PANEL: CPT

## 2020-11-13 PROCEDURE — 85027 COMPLETE CBC AUTOMATED: CPT

## 2020-11-13 PROCEDURE — 80053 COMPREHEN METABOLIC PANEL: CPT

## 2020-11-13 PROCEDURE — 36415 COLL VENOUS BLD VENIPUNCTURE: CPT

## 2020-11-25 ENCOUNTER — TELEMEDICINE (OUTPATIENT)
Dept: NEUROLOGY | Age: 30
End: 2020-11-25
Payer: COMMERCIAL

## 2020-11-25 PROCEDURE — 99214 OFFICE O/P EST MOD 30 MIN: CPT | Performed by: PSYCHIATRY & NEUROLOGY

## 2020-11-25 NOTE — PROGRESS NOTES
[facility], [address], [address] [place] [address]  Ph: [phone] or [phone]  FAX: [phone]    Chief Complaint: post concussion syndrome     Dear CAMPBELL Quick CNP     I had the pleasure of seeing your patient today in neurology consultation for his symptoms. As you would recall Navin Rubi is a 26 y.o. male. The history has been obtained from the patient and from the accompanying medical records. The patient comes in with his wife today. He does not have a history of headache and was at his baseline until six weeks ago. At that time while at work at [employer], the patient slipped and fell off a ladder from about three feet above the ground. He landed on the right side of his body, and his head hit the concrete. He immediately lost consciousness, and only has some pieces of memory after waking up. He remembers waking up with shoulder pain due to dislocation. He had an MRI of the brain on 2/10/2020 which was normal. Presently, the patient reports constant headache throughout day and night, worse upon awakening. Headaches occur constantly in the occipital area, with pressure spreading to the temples when they are worse. They are associated with photophobia and phonophobia. He needs to take Tylenol 1-2 times daily which grants pain relief. In addition, the patient thinks he has been sleeping more not, and reports unrefreshing sleep. His wife believes the patient has been having some issue with short term memory, and has been irritable. Otherwise, the patient denies neck pain, dizziness, tinnitus, and difficulty with concentration. 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 He is a non-smoker, does not drink alcohol, and denies recreational drug use. Today, the patient reports having the same headaches. The headaches are a constant dull ache described as a 3/10.  He also reports having issues with short-term memory. Patient describes recent irritability and aggression. He states Topomax helped tremendously previously but has not been as effective recently. REVIEW OF SYSTEMS     Constitutional Weight: absent, Appetite: absent, Fatigue: absent   HEENT Visual disturbance: absent, Ears: normal   Respiratory Shortness of breath: absent, Cough: absent   Cardiovascular Chest pain: absent, Leg swelling :absent   GI Constipation: absent, Diarrhea: absent, Swallowing change: absent    Urinary frequency: absent, Urinary urgency: absent,    Musculoskeletal Neck pain: absent, Back pain: absent, Stiffness: absent, Muscle pain: absent, Joint pain: absent   Dermatological Hair loss: absent, Skin changes: absent   Neurological Memory loss: present, Confusion: present, Seizures: absent, Trouble walking or imbalance: absent, Dizziness: present, Weakness: absent, Numbness: absent Tremor: absent, Spasm: absent, Speech difficulty: absent, Headache: present, Light sensitivity: present   Psychiatric Anxiety: present, Hallucination: absent, Depression, absent   Hematologic Abnormal bleeding/Bruising: absent, Anemia: absent       Neurological work up:  CT head  CTA head and neck  MRI brain   2 D echo     Past Medical History:   Diagnosis Date    Abdominal pain     Head injury     Rectal bleeding      Past Surgical History:   Procedure Laterality Date    COLONOSCOPY N/A 6/25/2019    COLONOSCOPY DIAGNOSTIC performed by Anthony Coronado MD at [address]   07/14/2017     No Known Allergies  No family history on file.    Social History     Socioeconomic History    Marital status:      Spouse name: Not on file    Number of children: Not on file    Years of education: Not on file    Highest education level: Not on file   Occupational History    Not on file   Social Needs    Financial resource strain: Not on file   Anitra-Sam insecurity     Worry: Not on file     Inability: Not on file    Transportation needs     Medical: Not on file     Non-medical: Not on file   Tobacco Use    Smoking status: Never Smoker    Smokeless tobacco: Never Used   Substance and Sexual Activity    Alcohol use: Not Currently     Alcohol/week: 3.0 standard drinks     Types: 3 Cans of beer per week    Drug use: No    Sexual activity: Not on file   Lifestyle    Physical activity     Days per week: Not on file     Minutes per session: Not on file    Stress: Not on file   Relationships    Social connections     Talks on phone: Not on file     Gets together: Not on file     Attends Lutheran service: Not on file     Active member of club or organization: Not on file     Attends meetings of clubs or organizations: Not on file     Relationship status: Not on file    Intimate partner violence     Fear of current or ex partner: Not on file     Emotionally abused: Not on file     Physically abused: Not on file     Forced sexual activity: Not on file   Other Topics Concern    Not on file   Social History Narrative    Not on file      There were no vitals taken for this visit. Physical examination:  General appearance: Normal. Well groomed.  In no acute distress    Head: Normocephalic, atraumatic  Eyes: Extraocular movements intact, eye lids normal  Lungs: Respirations unlabored, chest wall no deformity  ENT: Normal external ear canals, no sinus tenderness  Heart: Regular rate rhythm  Abdomen: No masses, tenderness  Extremities: No cyanosis or edema, 2+ pulses  Musculoskeletal: Normal range of motion in all joints  Skin: Intact, normal skin color    Neurological examination:    Mental status   Alert and oriented; intact memory with no confusion, speech or language problems; no hallucinations or delusions     Cranial nerves   II - visual fields intact to confrontation                                                III, IV, VI - extra-ocular muscles full: no pupillary defect; no DEUCE, no nystagmus, no ptosis   V - normal facial sensation                                                               VII - normal facial symmetry                                                             VIII - intact hearing                                                                             IX, X - symmetrical palate                                                                  XI - symmetrical shoulder shrug                                                       XII - midline tongue without atrophy or fasciculation     Motor function  Normal muscle bulk and tone; normal power 5/5, including fine motor movements     Sensory function Intact to touch, pin, vibration, proprioception     Cerebellar Intact fine motor movement. No involuntary movements or tremors     Reflex function Intact 2+ DTR and symmetric. Negative Babinski     Gait                  Normal station and gait       Lab Results   Component Value Date    LDLCALC 119 03/22/2017    LDLCHOLESTEROL 113 11/13/2020     No components found for: CHLPL  Lab Results   Component Value Date    TRIG 53 03/22/2017    TRIG 59 04/27/2016     Lab Results   Component Value Date    HDL 55 11/13/2020    HDL 58 03/22/2017    HDL 65 04/27/2016     Lab Results   Component Value Date    LDLCALC 119 03/22/2017    [address] 04/27/2016     No results found for: LABVLDL  No results found for: LABA1C  No results found for: EAG  No results found for: Jigar Ro     All of patient's labs were personally reviewed. All the imaging studies were personally reviewed and discussed with the patient. Assessment Recommendations:  Post concussion syndrome    The patient sustained trauma to the head after a fall about ten days ago, and has been experiencing a constellation of symptoms including headaches, insomnia, mood change, and issue with short-term memory. MRI of the brain on 2/10/2020 has been normal. He has been taking Tylenol on a daily basis. No neck pain, dizziness, tinnitus. Medication side effects were discussed with the patient and questions were answered. Patient is still exhibiting a constant, dull ache described at a 3/10. I recommend the patient increase his dosage of Topomax to 25 mg in the morning and 50 mg at night time. I recommend the patient keep a log of headaches and any symptoms he may experience with the dosage change. Patient is advised to call in 10-14 days to report how dosage change has affected his symptoms. Patient is advised to follow up in a month or sooner if symptoms worsen or if there are any side effects. Danell Dakins, APRN - CNP I would like to thank you for the consult. Please do not hesitate if you have any questions about the patient care. Scribe Attestation:   By signing my name below, Brynn Urbina, attest that this documentation has been prepared under the direction and in the presence of Kesha Enriquez MD.     Electronically Signed: Yina Nelson.    Physician Attestation:   Sohail Vaz MD, personally performed the services described in this documentation. All medical record entries made by the scribe were at my direction and in my presence. I have reviewed the chart and discharge instructions (if applicable) and agree that the record reflects my personal performance and is accurate and complete.     Electronically Signed: Carmen Henao 11/25/2020 10:39 AM    Diplomate, American Board of Psychiatry and Neurology  Diplomate, American Board of Clinical Neurophysiology  Diplomate, American Board of Epilepsy

## 2020-12-06 PROBLEM — F07.81 POST CONCUSSION SYNDROME: Status: ACTIVE | Noted: 2020-12-06

## 2020-12-18 NOTE — TELEPHONE ENCOUNTER
The patient can stop topiramate. We can start verapamil 80 mg p.o. daily. Please advise him to call us back in about 2 weeks time. If no improvement, the next step would be Botox injection.

## 2020-12-20 RX ORDER — VERAPAMIL HYDROCHLORIDE 80 MG/1
80 TABLET ORAL 3 TIMES DAILY
Qty: 90 TABLET | Refills: 3 | Status: SHIPPED | OUTPATIENT
Start: 2020-12-20 | End: 2021-09-28

## 2020-12-30 ENCOUNTER — HOSPITAL ENCOUNTER (OUTPATIENT)
Dept: GENERAL RADIOLOGY | Facility: CLINIC | Age: 30
Discharge: HOME | End: 2021-01-01
Payer: COMMERCIAL

## 2020-12-30 ENCOUNTER — HOSPITAL ENCOUNTER (OUTPATIENT)
Facility: CLINIC | Age: 30
Discharge: HOME | End: 2021-01-01
Payer: COMMERCIAL

## 2020-12-30 DIAGNOSIS — M25.521 ELBOW PAIN, CHRONIC, RIGHT: ICD-10-CM

## 2020-12-30 DIAGNOSIS — G89.29 ELBOW PAIN, CHRONIC, RIGHT: ICD-10-CM

## 2020-12-30 PROCEDURE — 73080 X-RAY EXAM OF ELBOW: CPT

## 2021-02-18 ENCOUNTER — TELEMEDICINE (OUTPATIENT)
Dept: NEUROLOGY | Age: 31
End: 2021-02-18
Payer: COMMERCIAL

## 2021-02-18 DIAGNOSIS — G43.711 CHRONIC MIGRAINE WITHOUT AURA, WITH INTRACTABLE MIGRAINE, SO STATED, WITH STATUS MIGRAINOSUS: Primary | ICD-10-CM

## 2021-02-18 PROCEDURE — 99214 OFFICE O/P EST MOD 30 MIN: CPT | Performed by: PSYCHIATRY & NEUROLOGY

## 2021-02-18 RX ORDER — ERENUMAB-AOOE 70 MG/ML
70 INJECTION SUBCUTANEOUS
Qty: 2 PEN | Refills: 2 | Status: SHIPPED | OUTPATIENT
Start: 2021-02-18 | End: 2021-03-15 | Stop reason: SDUPTHER

## 2021-02-18 NOTE — PROGRESS NOTES
[facility], [address], [address] [facility]  Ph: [phone] or [phone]  FAX: [phone]    Chief Complaint: post concussion syndrome     Dear CAMPBELL Blackburn CNP     I had the pleasure of seeing your patient today in neurology consultation for his symptoms. As you would recall Renan Taylor is a 26 y.o. male. The history has been obtained from the patient and from the accompanying medical records. The patient comes in with his wife today. He does not have a history of headache and was at his baseline until six weeks ago. At that time while at work at [employer], the patient slipped and fell off a ladder from about three feet above the ground. He landed on the right side of his body, and his head hit the concrete. He immediately lost consciousness, and only has some pieces of memory after waking up. He remembers waking up with shoulder pain due to dislocation. He had an MRI of the brain on 2/10/2020 which was normal. Presently, the patient reports constant headache throughout day and night, worse upon awakening. Headaches occur constantly in the occipital area, with pressure spreading to the temples when they are worse. They are associated with photophobia and phonophobia. He needs to take Tylenol 1-2 times daily which grants pain relief. In addition, the patient thinks he has been sleeping more not, and reports unrefreshing sleep. His wife believes the patient has been having some issue with short term memory, and has been irritable. Otherwise, the patient denies neck pain, dizziness, tinnitus, and difficulty with concentration. 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 He is a non-smoker, does not drink alcohol, and denies recreational drug use. Today, the patient reports worsening of headaches and migraines.  The migraines and headaches are more frequent as well recently. He sometimes misses Verapamil occasionally. REVIEW OF SYSTEMS     Constitutional Weight: absent, Appetite: absent, Fatigue: absent   HEENT Visual disturbance: absent, Ears: normal   Respiratory Shortness of breath: absent, Cough: absent   Cardiovascular Chest pain: absent, Leg swelling :absent   GI Constipation: absent, Diarrhea: absent, Swallowing change: absent    Urinary frequency: absent, Urinary urgency: absent,    Musculoskeletal Neck pain: absent, Back pain: absent, Stiffness: absent, Muscle pain: absent, Joint pain: absent   Dermatological Hair loss: absent, Skin changes: absent   Neurological Memory loss: present, Confusion: present, Seizures: absent, Trouble walking or imbalance: absent, Dizziness: present, Weakness: absent, Numbness: absent Tremor: absent, Spasm: absent, Speech difficulty: absent, Headache: present, Light sensitivity: present   Psychiatric Anxiety: present, Hallucination: absent, Depression, absent   Hematologic Abnormal bleeding/Bruising: absent, Anemia: absent       Neurological work up:  CT head  CTA head and neck  MRI brain   2 D echo     Past Medical History:   Diagnosis Date    Abdominal pain     Head injury     Rectal bleeding      Past Surgical History:   Procedure Laterality Date    COLONOSCOPY N/A 6/25/2019    [facility]     No Known Allergies  No family history on file.    Social History     Socioeconomic History    Marital status:      Spouse name: Not on file    Number of children: Not on file    Years of education: Not on file    Highest education level: Not on file   Occupational History    Not on file   Social Needs    Financial resource strain: Not on file    Food insecurity     Worry: Not on file     Inability: Not on file    Transportation needs     Medical: Not on file     Non-medical: Not on file   Tobacco Use    Smoking status: Never Smoker    Smokeless tobacco: Never Used   Substance and Sexual Activity    Alcohol use: Not Currently     Alcohol/week: 3.0 standard drinks     Types: 3 Cans of beer per week    Drug use: No    Sexual activity: Not on file   Lifestyle    Physical activity     Days per week: Not on file     Minutes per session: Not on file    Stress: Not on file   Relationships    Social connections     Talks on phone: Not on file     Gets together: Not on file     Attends Jain service: Not on file     Active member of club or organization: Not on file     Attends meetings of clubs or organizations: Not on file     Relationship status: Not on file    Intimate partner violence     Fear of current or ex partner: Not on file     Emotionally abused: Not on file     Physically abused: Not on file     Forced sexual activity: Not on file   Other Topics Concern    Not on file   Social History Narrative    Not on file      There were no vitals taken for this visit. Physical examination:  General appearance: Normal. Well groomed.  In no acute distress    Head: Normocephalic, atraumatic  Eyes: Extraocular movements intact, eye lids normal  Lungs: Respirations unlabored, chest wall no deformity  ENT: Normal external ear canals, no sinus tenderness  Heart: Regular rate rhythm  Abdomen: No masses, tenderness  Extremities: No cyanosis or edema, 2+ pulses  Musculoskeletal: Normal range of motion in all joints  Skin: Intact, normal skin color    Neurological examination:    Mental status   Alert and oriented; intact memory with no confusion, speech or language problems; no hallucinations or delusions     Cranial nerves   II - visual fields intact to confrontation                                                III, IV, VI - extra-ocular muscles full: no pupillary defect; no DEUCE, no nystagmus, no ptosis   V - normal facial sensation                                                               VII - normal facial symmetry                                                             VIII - intact hearing                                                                             IX, X - symmetrical palate                                                                  XI - symmetrical shoulder shrug                                                       XII - midline tongue without atrophy or fasciculation     Motor function  Normal muscle bulk and tone; normal power 5/5, including fine motor movements     Sensory function Intact to touch, pin, vibration, proprioception     Cerebellar Intact fine motor movement. No involuntary movements or tremors     Reflex function Intact 2+ DTR and symmetric. Negative Babinski     Gait                  Normal station and gait       Lab Results   Component Value Date    LDLCALC 119 03/22/2017    LDLCHOLESTEROL 113 11/13/2020     No components found for: CHLPL  Lab Results   Component Value Date    TRIG 53 03/22/2017    TRIG 59 04/27/2016     Lab Results   Component Value Date    HDL 55 11/13/2020    HDL 58 03/22/2017    HDL 65 04/27/2016     Lab Results   Component Value Date    LDLCALC 119 03/22/2017    [address] 04/27/2016     No results found for: LABVLDL  No results found for: LABA1C  No results found for: EAG  No results found for: Penny Mortimer     All of patient's labs were personally reviewed. All the imaging studies were personally reviewed and discussed with the patient. Assessment Recommendations:  Post concussion syndrome with medically intractable migraine headache with status migrainosus without aura. The patient sustained trauma to the head after a fall and has been experiencing a constellation of symptoms including headaches, insomnia, mood change, and issue with short-term memory. MRI of the brain on 2/10/2020 has been normal. He has been taking Tylenol on a daily basis. No neck pain, dizziness, tinnitus.     Patient is still migraines and headaches despite trying both verapamil and topomax. Discontinue verapamil. I initiated the patient on Aimovig 70 mg/mL subcutaneous once monthly for headache and migraine prophylaxis. I discussed the possibility of Botox injections if Aimvog is not helpful with symptoms. Medication side effects were discussed and questions were answered. Patient is advised to call in several days to report how Krys Sears has affected his symptoms. Patient is advised to follow up in two months or sooner if symptoms worsen or if there are any side effects. Garett Dye, APRN - CNP I would like to thank you for the consult. Please do not hesitate if you have any questions about the patient care. Scribe Attestation:   By signing my name below, Duncan Kimble, attest that this documentation has been prepared under the direction and in the presence of Sisto Babinski MD.    Electronically Signed: Warren Murray 2/18/2021 12:33 PM    Physician Attestation:  Ayo Walsh MD, personally performed the services described in this documentation. All medical record entries made by the scribe were at my direction and in my presence. I have reviewed the chart and discharge instructions (if applicable) and agree that the record reflects my personal performance and is accurate and complete.     Electronically Signed: Adrien Mendoza 2/18/2021 12:33 PM    Diplomate, American Board of Psychiatry and Neurology  Diplomate, American Board of Clinical Neurophysiology  Diplomate, American Board of Epilepsy

## 2021-07-19 ENCOUNTER — TELEMEDICINE (OUTPATIENT)
Dept: NEUROLOGY | Age: 31
End: 2021-07-19
Payer: COMMERCIAL

## 2021-07-19 DIAGNOSIS — G43.711 CHRONIC MIGRAINE WITHOUT AURA, WITH INTRACTABLE MIGRAINE, SO STATED, WITH STATUS MIGRAINOSUS: Primary | ICD-10-CM

## 2021-07-19 DIAGNOSIS — F07.81 POST CONCUSSION SYNDROME: ICD-10-CM

## 2021-07-19 PROCEDURE — 99214 OFFICE O/P EST MOD 30 MIN: CPT | Performed by: PSYCHIATRY & NEUROLOGY

## 2021-07-19 RX ORDER — ERENUMAB-AOOE 140 MG/ML
140 INJECTION, SOLUTION SUBCUTANEOUS
Qty: 2 PEN | Refills: 2 | Status: SHIPPED | OUTPATIENT
Start: 2021-07-19 | End: 2021-09-17

## 2021-07-19 NOTE — PROGRESS NOTES
[facility], [address], [address] [place] [address]  Ph: [phone] or [phone]  FAX: [phone]    Chief Complaint: post concussion syndrome     Dear CAMPBELL Conde - CNP     I had the pleasure of seeing your patient today in neurology consultation for his symptoms. As you would recall Skip Guillory is a 26 y.o. male. The history has been obtained from the patient and from the accompanying medical records. The patient comes in with his wife today. He does not have a history of headache and was at his baseline until six weeks ago. At that time while at work at [employer], the patient slipped and fell off a ladder from about three feet above the ground. He landed on the right side of his body, and his head hit the concrete. He immediately lost consciousness, and only has some pieces of memory after waking up. He remembers waking up with shoulder pain due to dislocation. He had an MRI of the brain on 2/10/2020 which was normal. Presently, the patient reports constant headache throughout day and night, worse upon awakening. Headaches occur constantly in the occipital area, with pressure spreading to the temples when they are worse. They are associated with photophobia and phonophobia. He needs to take Tylenol 1-2 times daily which grants pain relief. In addition, the patient thinks he has been sleeping more not, and reports unrefreshing sleep. His wife believes the patient has been having some issue with short term memory, and has been irritable. Otherwise, the patient denies neck pain, dizziness, tinnitus, and difficulty with concentration. 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 He is a non-smoker, does not drink alcohol, and denies recreational drug use.      Today, the patient reports he has been having issue through workers comp with [employer] for his headaches, he got samples through primary care and has been taking 70mg Aimovig,he is doing better with headaches and is having mild side efeccts from Lucy Daren. He reports sleep is a little better, has 1 episode of migraine every 2 weeks. He reports memory has been the same since last visit. REVIEW OF SYSTEMS     Constitutional Weight: absent, Appetite: absent, Fatigue: absent   HEENT Visual disturbance: absent, Ears: normal   Respiratory Shortness of breath: absent, Cough: absent   Cardiovascular Chest pain: absent, Leg swelling :absent   GI Constipation: absent, Diarrhea: absent, Swallowing change: absent    Urinary frequency: absent, Urinary urgency: absent,    Musculoskeletal Neck pain: absent, Back pain: absent, Stiffness: absent, Muscle pain: absent, Joint pain: absent   Dermatological Hair loss: absent, Skin changes: absent   Neurological Memory loss: present, Confusion: present, Seizures: absent, Trouble walking or imbalance: absent, Dizziness: present, Weakness: absent, Numbness: absent Tremor: absent, Spasm: absent, Speech difficulty: absent, Headache: present, Light sensitivity: present   Psychiatric Anxiety: present, Hallucination: absent, Depression, absent   Hematologic Abnormal bleeding/Bruising: absent, Anemia: absent       Neurological work up:  CT head  CTA head and neck  MRI brain   2 D echo     Past Medical History:   Diagnosis Date    Abdominal pain     Head injury     Rectal bleeding      Past Surgical History:   Procedure Laterality Date    COLONOSCOPY N/A 6/25/2019    COLONOSCOPY DIAGNOSTIC performed by [facility]gy Cristobal [facility]   07/14/2017     No Known Allergies  History reviewed. No pertinent family history.    Social History     Socioeconomic History    Marital status:      Spouse name: Not on file    Number of children: Not on file    Years of education: Not on file    Highest education level: Not on file   Occupational History intact memory with no confusion, speech or language problems; no hallucinations or delusions     Cranial nerves   II - visual fields intact to confrontation                                                III, IV, VI - extra-ocular muscles full: no pupillary defect; no DEUCE, no nystagmus, no ptosis   V - normal facial sensation                                                               VII - normal facial symmetry                                                             VIII - intact hearing                                                                             IX, X - symmetrical palate                                                                  XI - symmetrical shoulder shrug                                                       XII - midline tongue without atrophy or fasciculation     Motor function  Normal muscle bulk and tone; normal power 5/5, including fine motor movements     Sensory function Intact to touch, pin, vibration, proprioception     Cerebellar Intact fine motor movement. No involuntary movements or tremors     Reflex function Intact 2+ DTR and symmetric. Negative Babinski     Gait                  Normal station and gait       Lab Results   Component Value Date    LDLCALC 119 03/22/2017    LDLCHOLESTEROL 113 11/13/2020     No components found for: CHLPL  Lab Results   Component Value Date    TRIG 53 03/22/2017    TRIG 59 04/27/2016     Lab Results   Component Value Date    HDL 55 11/13/2020    HDL 58 03/22/2017    HDL 65 04/27/2016     Lab Results   Component Value Date    LDLCALC 119 03/22/2017    [address] 04/27/2016     No results found for: LABVLDL  No results found for: LABA1C  No results found for: EAG  No results found for: Ezra Walsh     All of patient's labs were personally reviewed. All the imaging studies were personally reviewed and discussed with the patient.      Assessment Recommendations:  Post concussion syndrome with medically intractable migraine headache with status migrainosus without aura. The patient sustained trauma to the head after a fall and has been experiencing a constellation of symptoms including headaches, insomnia, mood change, and issue with short-term memory. MRI of the brain on 2/10/2020 has been normal. He has been taking Tylenol on a daily basis. No neck pain, dizziness, tinnitus. Patient is still having migraines and headaches once every 2 weeks. I discussed with the patient that we will increase Aimvog to 140mg, I am hoping he would notice further improvement on headaches with increase in Aimovig. I discussed the possibility of Botox injections if Aimvog is not helpful with symptoms. Patient is advised to follow up in 4 months or sooner if symptoms worsen or if there are any side effects. Juliano Asher, APRN - CNP I would like to thank you for the consult. Please do not hesitate if you have any questions about the patient care. Scribe Attestation:   I, Alexei Zayas, am scribing for, and in the presence of, Dr Rogelio Stoddard. Electronically signed by: Quincy Galicia 7/19/21     Physician Attestation:  Ruben Wheatley MD, personally performed the services described in this documentation. All medical record entries made by the scribe were at my direction and in my presence. I have reviewed the chart and discharge instructions (if applicable) and agree that the record reflects my personal performance and is accurate and complete.     Electronically Signed: Sourav Luciano 7/19/2021 1:04 PM    Diplomate, American Board of Psychiatry and Neurology  Diplomate, American Board of Clinical Neurophysiology  Diplomate, American Board of Epilepsy

## 2021-10-20 ENCOUNTER — HOSPITAL ENCOUNTER (OUTPATIENT)
Dept: MRI IMAGING | Facility: CLINIC | Age: 31
Discharge: HOME | End: 2021-10-22
Payer: COMMERCIAL

## 2021-10-20 DIAGNOSIS — F07.81 POSTCONCUSSION SYNDROME: ICD-10-CM

## 2021-10-20 PROCEDURE — A9579 GAD-BASE MR CONTRAST NOS,1ML: HCPCS | Performed by: NURSE PRACTITIONER

## 2021-10-20 PROCEDURE — 70553 MRI BRAIN STEM W/O & W/DYE: CPT

## 2021-10-20 PROCEDURE — 6360000004 HC RX CONTRAST MEDICATION: Performed by: NURSE PRACTITIONER

## 2021-10-20 PROCEDURE — 2580000003 HC RX 258: Performed by: NURSE PRACTITIONER

## 2021-10-20 RX ORDER — SODIUM CHLORIDE 0.9 % (FLUSH) 0.9 %
10 SYRINGE (ML) INJECTION PRN
Status: DISCONTINUED | OUTPATIENT
Start: 2021-10-20 | End: 2021-10-23 | Stop reason: HOSPADM

## 2021-10-20 RX ADMIN — SODIUM CHLORIDE, PRESERVATIVE FREE 10 ML: 5 INJECTION INTRAVENOUS at 08:18

## 2021-10-20 RX ADMIN — GADOTERIDOL 18 ML: 279.3 INJECTION, SOLUTION INTRAVENOUS at 08:17

## 2021-11-10 ENCOUNTER — HOSPITAL ENCOUNTER (OUTPATIENT)
Age: 31
Setting detail: SPECIMEN
Discharge: HOME | End: 2021-11-10
Payer: COMMERCIAL

## 2021-11-10 LAB
ALBUMIN SERPL-MCNC: 4.2 G/DL (ref 3.5–5.2)
ALBUMIN/GLOBULIN RATIO: 1.5 (ref 1–2.5)
ALP BLD-CCNC: 69 U/L (ref 40–129)
ALT SERPL-CCNC: 30 U/L (ref 5–41)
ANION GAP SERPL CALCULATED.3IONS-SCNC: 13 MMOL/L (ref 9–17)
AST SERPL-CCNC: 28 U/L
BILIRUB SERPL-MCNC: 0.34 MG/DL (ref 0.3–1.2)
BUN BLDV-MCNC: 13 MG/DL (ref 6–20)
BUN/CREAT BLD: ABNORMAL (ref 9–20)
CALCIUM SERPL-MCNC: 9.3 MG/DL (ref 8.6–10.4)
CHLORIDE BLD-SCNC: 103 MMOL/L (ref 98–107)
CO2: 24 MMOL/L (ref 20–31)
CREAT SERPL-MCNC: 1.07 MG/DL (ref 0.7–1.2)
FOLATE: 4.4 NG/ML
GFR AFRICAN AMERICAN: >60 ML/MIN
GFR NON-AFRICAN AMERICAN: >60 ML/MIN
GFR SERPL CREATININE-BSD FRML MDRD: ABNORMAL ML/MIN/{1.73_M2}
GFR SERPL CREATININE-BSD FRML MDRD: ABNORMAL ML/MIN/{1.73_M2}
GLUCOSE BLD-MCNC: 100 MG/DL (ref 70–99)
POTASSIUM SERPL-SCNC: 4.5 MMOL/L (ref 3.7–5.3)
SODIUM BLD-SCNC: 140 MMOL/L (ref 135–144)
TOTAL PROTEIN: 7 G/DL (ref 6.4–8.3)
TSH SERPL DL<=0.05 MIU/L-ACNC: 1.02 MIU/L (ref 0.3–5)
VITAMIN B-12: 494 PG/ML (ref 232–1245)

## 2021-12-06 ENCOUNTER — HOSPITAL ENCOUNTER (OUTPATIENT)
Age: 31
Setting detail: SPECIMEN
Discharge: HOME | End: 2021-12-06

## 2021-12-06 DIAGNOSIS — Z13.9 SCREENING FOR CONDITION: ICD-10-CM

## 2021-12-06 LAB
ANION GAP SERPL CALCULATED.3IONS-SCNC: 12 MMOL/L (ref 9–17)
BUN BLDV-MCNC: 13 MG/DL (ref 6–20)
BUN/CREAT BLD: NORMAL (ref 9–20)
CALCIUM SERPL-MCNC: 9 MG/DL (ref 8.6–10.4)
CHLORIDE BLD-SCNC: 106 MMOL/L (ref 98–107)
CHOLESTEROL/HDL RATIO: 2.4
CHOLESTEROL: 152 MG/DL
CO2: 25 MMOL/L (ref 20–31)
CREAT SERPL-MCNC: 1.11 MG/DL (ref 0.7–1.2)
GFR AFRICAN AMERICAN: >60 ML/MIN
GFR NON-AFRICAN AMERICAN: >60 ML/MIN
GFR SERPL CREATININE-BSD FRML MDRD: NORMAL ML/MIN/{1.73_M2}
GFR SERPL CREATININE-BSD FRML MDRD: NORMAL ML/MIN/{1.73_M2}
GLUCOSE BLD-MCNC: 82 MG/DL (ref 70–99)
HDLC SERPL-MCNC: 63 MG/DL
LDL CHOLESTEROL: 82 MG/DL (ref 0–130)
POTASSIUM SERPL-SCNC: 4.7 MMOL/L (ref 3.7–5.3)
SODIUM BLD-SCNC: 143 MMOL/L (ref 135–144)
TRIGL SERPL-MCNC: 37 MG/DL
VLDLC SERPL CALC-MCNC: NORMAL MG/DL (ref 1–30)

## 2021-12-07 LAB
ESTIMATED AVERAGE GLUCOSE: 91 MG/DL
HBA1C MFR BLD: 4.8 % (ref 4–6)

## 2022-08-26 NOTE — TELEPHONE ENCOUNTER
Cont with the Bentyl and have him take Advil and alternate with Tylinol. Pt instructed to go to ER if no improvement or worsening of symptoms. Show Applicator Variable?: Yes Number Of Freeze-Thaw Cycles: 2 freeze-thaw cycles Include Z78.9 (Other Specified Conditions Influencing Health Status) As An Associated Diagnosis?: No Spray Paint Text: The liquid nitrogen was applied to the skin utilizing a spray paint frosting technique. Detail Level: Zone Post-Care Instructions: I reviewed with the patient in detail post-care instructions. Patient is to wear sunprotection, and avoid picking at any of the treated lesions. Pt may apply Vaseline to crusted or scabbing areas. Wound care handout given. Consent: The patient's consent was obtained including but not limited to risks of crusting, scabbing, blistering, scarring, darker or lighter pigmentary change, recurrence, incomplete removal and infection. Medical Necessity Information: It is in your best interest to select a reason for this procedure from the list below. All of these items fulfill various CMS LCD requirements except the new and changing color options. Medical Necessity Clause: This procedure was medically necessary because the lesions that were treated were:

## 2022-09-20 ENCOUNTER — APPOINTMENT (OUTPATIENT)
Dept: GENERAL RADIOLOGY | Age: 32
End: 2022-09-20
Payer: COMMERCIAL

## 2022-09-20 ENCOUNTER — HOSPITAL ENCOUNTER (EMERGENCY)
Age: 32
Discharge: ANOTHER HOSPITAL | End: 2022-09-20
Attending: EMERGENCY MEDICINE
Payer: COMMERCIAL

## 2022-09-20 ENCOUNTER — APPOINTMENT (OUTPATIENT)
Dept: CT IMAGING | Age: 32
End: 2022-09-20
Payer: COMMERCIAL

## 2022-09-20 VITALS
DIASTOLIC BLOOD PRESSURE: 67 MMHG | RESPIRATION RATE: 33 BRPM | OXYGEN SATURATION: 100 % | HEART RATE: 103 BPM | SYSTOLIC BLOOD PRESSURE: 80 MMHG | WEIGHT: 220.46 LBS
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
## 2022-09-20 DIAGNOSIS — T30.0 BURN: Primary | ICD-10-CM

## 2022-09-20 LAB
ABO/RH: NORMAL
ANION GAP SERPL CALCULATED.3IONS-SCNC: 22 MMOL/L (ref 9–17)
ANTIBODY SCREEN: NEGATIVE
ARM BAND NUMBER: NORMAL
BLOOD BANK SPECIMEN: ABNORMAL
BUN BLDV-MCNC: 15 MG/DL (ref 6–20)
CARBOXYHEMOGLOBIN: 2.3 % (ref 0–5)
CHLORIDE BLD-SCNC: 102 MMOL/L (ref 98–107)
CO2: 15 MMOL/L (ref 20–31)
CREAT SERPL-MCNC: 1.65 MG/DL (ref 0.7–1.2)
ETHANOL PERCENT: <0.01 %
ETHANOL: <10 MG/DL
EXPIRATION DATE: NORMAL
FIO2: ABNORMAL
GLUCOSE BLD-MCNC: 206 MG/DL (ref 70–99)
HCG QUALITATIVE: ABNORMAL
HCO3 VENOUS: 15.9 MMOL/L (ref 24–30)
HCT VFR BLD CALC: 46.4 % (ref 40.7–50.3)
HEMOGLOBIN: 16 G/DL (ref 13–17)
INR BLD: 1.2
MCH RBC QN AUTO: 28.4 PG (ref 25.2–33.5)
MCHC RBC AUTO-ENTMCNC: 34.5 G/DL (ref 28.4–34.8)
MCV RBC AUTO: 82.4 FL (ref 82.6–102.9)
MYOGLOBIN: 1550 NG/ML (ref 28–72)
NEGATIVE BASE EXCESS, VEN: 11.7 MMOL/L (ref 0–2)
NRBC AUTOMATED: 0 PER 100 WBC
O2 SAT, VEN: 98.5 % (ref 60–85)
PARTIAL THROMBOPLASTIN TIME: 21 SEC (ref 20.5–30.5)
PATIENT TEMP: 37
PCO2, VEN: 41.4 MM HG (ref 39–55)
PDW BLD-RTO: 12.5 % (ref 11.8–14.4)
PH VENOUS: 7.21 (ref 7.32–7.42)
PLATELET # BLD: 416 K/UL (ref 138–453)
PMV BLD AUTO: 9.6 FL (ref 8.1–13.5)
PO2, VEN: 150 MM HG (ref 30–50)
POTASSIUM SERPL-SCNC: 3.5 MMOL/L (ref 3.7–5.3)
PROTHROMBIN TIME: 11.8 SEC (ref 9.1–12.3)
RBC # BLD: 5.63 M/UL (ref 4.21–5.77)
SODIUM BLD-SCNC: 139 MMOL/L (ref 135–144)
TROPONIN, HIGH SENSITIVITY: <6 NG/L (ref 0–22)
WBC # BLD: 19.1 K/UL (ref 3.5–11.3)

## 2022-09-20 PROCEDURE — 84520 ASSAY OF UREA NITROGEN: CPT

## 2022-09-20 PROCEDURE — 71260 CT THORAX DX C+: CPT

## 2022-09-20 PROCEDURE — 84703 CHORIONIC GONADOTROPIN ASSAY: CPT

## 2022-09-20 PROCEDURE — 85027 COMPLETE CBC AUTOMATED: CPT

## 2022-09-20 PROCEDURE — 96374 THER/PROPH/DIAG INJ IV PUSH: CPT

## 2022-09-20 PROCEDURE — 31500 INSERT EMERGENCY AIRWAY: CPT

## 2022-09-20 PROCEDURE — 80051 ELECTROLYTE PANEL: CPT

## 2022-09-20 PROCEDURE — 36556 INSERT NON-TUNNEL CV CATH: CPT

## 2022-09-20 PROCEDURE — 84484 ASSAY OF TROPONIN QUANT: CPT

## 2022-09-20 PROCEDURE — 6360000002 HC RX W HCPCS: Performed by: EMERGENCY MEDICINE

## 2022-09-20 PROCEDURE — 85610 PROTHROMBIN TIME: CPT

## 2022-09-20 PROCEDURE — 2700000000 HC OXYGEN THERAPY PER DAY

## 2022-09-20 PROCEDURE — G0480 DRUG TEST DEF 1-7 CLASSES: HCPCS

## 2022-09-20 PROCEDURE — 94761 N-INVAS EAR/PLS OXIMETRY MLT: CPT

## 2022-09-20 PROCEDURE — 3209999900 CT LUMBAR SPINE TRAUMA RECONSTRUCTION

## 2022-09-20 PROCEDURE — 86901 BLOOD TYPING SEROLOGIC RH(D): CPT

## 2022-09-20 PROCEDURE — 6810039000 HC L1 TRAUMA ALERT

## 2022-09-20 PROCEDURE — 86850 RBC ANTIBODY SCREEN: CPT

## 2022-09-20 PROCEDURE — 6360000004 HC RX CONTRAST MEDICATION: Performed by: STUDENT IN AN ORGANIZED HEALTH CARE EDUCATION/TRAINING PROGRAM

## 2022-09-20 PROCEDURE — 2580000003 HC RX 258

## 2022-09-20 PROCEDURE — 3209999900 CT THORACIC SPINE TRAUMA RECONSTRUCTION

## 2022-09-20 PROCEDURE — 86900 BLOOD TYPING SEROLOGIC ABO: CPT

## 2022-09-20 PROCEDURE — 51702 INSERT TEMP BLADDER CATH: CPT

## 2022-09-20 PROCEDURE — 85730 THROMBOPLASTIN TIME PARTIAL: CPT

## 2022-09-20 PROCEDURE — 80307 DRUG TEST PRSMV CHEM ANLYZR: CPT

## 2022-09-20 PROCEDURE — 83874 ASSAY OF MYOGLOBIN: CPT

## 2022-09-20 PROCEDURE — 82805 BLOOD GASES W/O2 SATURATION: CPT

## 2022-09-20 PROCEDURE — 70450 CT HEAD/BRAIN W/O DYE: CPT

## 2022-09-20 PROCEDURE — 99285 EMERGENCY DEPT VISIT HI MDM: CPT

## 2022-09-20 PROCEDURE — 82947 ASSAY GLUCOSE BLOOD QUANT: CPT

## 2022-09-20 PROCEDURE — 72125 CT NECK SPINE W/O DYE: CPT

## 2022-09-20 PROCEDURE — 82565 ASSAY OF CREATININE: CPT

## 2022-09-20 PROCEDURE — 2500000003 HC RX 250 WO HCPCS

## 2022-09-20 PROCEDURE — 36620 INSERTION CATHETER ARTERY: CPT

## 2022-09-20 RX ORDER — SODIUM CHLORIDE 9 MG/ML
INJECTION, SOLUTION INTRAVENOUS
Status: DISCONTINUED
Start: 2022-09-20 | End: 2022-09-21 | Stop reason: HOSPADM

## 2022-09-20 RX ORDER — PROPOFOL 10 MG/ML
INJECTION, EMULSION INTRAVENOUS
Status: DISCONTINUED
Start: 2022-09-20 | End: 2022-09-21 | Stop reason: HOSPADM

## 2022-09-20 RX ORDER — MIDAZOLAM HYDROCHLORIDE 1 MG/ML
INJECTION INTRAMUSCULAR; INTRAVENOUS
Status: DISCONTINUED
Start: 2022-09-20 | End: 2022-09-21 | Stop reason: HOSPADM

## 2022-09-20 RX ORDER — FENTANYL CITRATE 50 UG/ML
INJECTION, SOLUTION INTRAMUSCULAR; INTRAVENOUS
Status: DISCONTINUED
Start: 2022-09-20 | End: 2022-09-21 | Stop reason: HOSPADM

## 2022-09-20 RX ORDER — FENTANYL CITRATE 50 UG/ML
INJECTION, SOLUTION INTRAMUSCULAR; INTRAVENOUS DAILY PRN
Status: COMPLETED | OUTPATIENT
Start: 2022-09-20 | End: 2022-09-20

## 2022-09-20 RX ADMIN — IOPAMIDOL 130 ML: 755 INJECTION, SOLUTION INTRAVENOUS at 21:00

## 2022-09-20 RX ADMIN — FENTANYL CITRATE 100 MCG: 50 INJECTION, SOLUTION INTRAMUSCULAR; INTRAVENOUS at 19:36

## 2022-09-20 ASSESSMENT — PULMONARY FUNCTION TESTS: PIF_VALUE: 21

## 2022-09-20 NOTE — ED NOTES
Patient log rolled to visualized posterior surfaces and change linens.       Layo Mendoza RN  09/20/22 2069
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
## 2022-09-20 NOTE — ED NOTES
Patient intubated by Dr. Janusz Hutchinson. 7.5 ett, 25 at the lip. Good color change noted. Bilateral breath sounds auscultated.       Margaret Monae RN  09/20/22 1939

## 2022-09-21 LAB
AMPHETAMINE SCREEN URINE: NORMAL
BARBITURATE SCREEN URINE: NORMAL
BENZODIAZEPINE SCREEN, URINE: NORMAL
CANNABINOID SCREEN URINE: NORMAL
COCAINE METABOLITE, URINE: NORMAL
FENTANYL URINE: NORMAL
METHADONE SCREEN, URINE: NORMAL
OPIATES, URINE: NORMAL
OXYCODONE SCREEN URINE: NORMAL
PHENCYCLIDINE, URINE: NORMAL
TEST INFORMATION: NORMAL

## 2022-09-21 NOTE — ED NOTES
Fluids being ran via fluid warmer ranger, connected per ICU nurse     Lexa Miranda RN  09/20/22 2831

## 2022-09-21 NOTE — ED NOTES
Patients ring handed to , whom handed to family member     Almita Younger, [facility]  09/20/22 2120

## 2022-09-21 NOTE — ED NOTES
Returns from [facility] scan     Jess Cadet RN  09/20/22 6166
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
## 2022-09-21 NOTE — ED NOTES
[facility] present at bedside for transport to [facility] CD handed to [facility] crew     Pacolisatammy Vallecillo, [address]  09/20/22 6324

## 2022-09-21 NOTE — ED NOTES
Dr. Parviz Mayes inserting central line with Dr. Melvin Price assisting     Sheela Alvarez, RN  09/20/22 2006
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
## 2022-09-21 NOTE — ED NOTES
Report called to [facility], spoke with charge nurse via telephone.        Jacob Silverman RN  09/20/22 6657
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
## 2022-09-21 NOTE — ED NOTES
2mg versed given by Kathie Inman RN per Dr. Zeb Irizarry verbal orders     Zenon Amos RN  09/20/22 2102
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
## 2022-09-21 NOTE — ED NOTES
[facility] present for transport to [facility]  Family also at bedside at this time    Patient stable  Bedside report given to transport [facility] [address] [facility], RN  09/20/22 1555

## 2022-09-21 NOTE — ED NOTES
2mg versed given by Indy Hernandez RN per Dr. Chapin Oliveira verbal orders     Mindy Carbajal RN  09/20/22 5040

## (undated) DEVICE — Z INACTIVE USE 2525529 CONNECTOR TBNG FOR O2

## (undated) DEVICE — Z DUPLICATE USE 2527422 TUBING O2 STD 7 FT EXTN NO CRUSH VISUAL CNTRST LF

## (undated) DEVICE — GOWN,POLY REINFORCED,LG: Brand: MEDLINE

## (undated) DEVICE — DEFENDO AIR WATER SUCTION AND BIOPSY VALVE KIT FOR  OLYMPUS: Brand: DEFENDO AIR/WATER/SUCTION AND BIOPSY VALVE

## (undated) DEVICE — JELLY,LUBE,STERILE,FLIP TOP,TUBE,2-OZ: Brand: MEDLINE

## (undated) DEVICE — CANNULA NSL AD TBNG L7FT PVC STR NONFLARED PRNG O2 DEL W STD

## (undated) DEVICE — Z DISCONTINUED USE 2424143 ADAPTER O2 SWVL CHRISTMAS TREE GRN